# Patient Record
Sex: FEMALE | Race: WHITE | Employment: OTHER | URBAN - METROPOLITAN AREA
[De-identification: names, ages, dates, MRNs, and addresses within clinical notes are randomized per-mention and may not be internally consistent; named-entity substitution may affect disease eponyms.]

---

## 2017-01-12 ENCOUNTER — OFFICE VISIT (OUTPATIENT)
Dept: PAIN MANAGEMENT | Age: 67
End: 2017-01-12

## 2017-01-12 VITALS
BODY MASS INDEX: 19.15 KG/M2 | SYSTOLIC BLOOD PRESSURE: 163 MMHG | WEIGHT: 122 LBS | HEART RATE: 61 BPM | HEIGHT: 67 IN | DIASTOLIC BLOOD PRESSURE: 87 MMHG

## 2017-01-12 DIAGNOSIS — R07.81 RIB PAIN: ICD-10-CM

## 2017-01-12 DIAGNOSIS — G54.8 OTHER NERVE ROOT AND PLEXUS DISORDERS: ICD-10-CM

## 2017-01-12 DIAGNOSIS — Z79.899 ENCOUNTER FOR LONG-TERM (CURRENT) USE OF HIGH-RISK MEDICATION: ICD-10-CM

## 2017-01-12 DIAGNOSIS — G89.4 CHRONIC PAIN SYNDROME: Primary | ICD-10-CM

## 2017-01-12 RX ORDER — HYDROCODONE BITARTRATE AND ACETAMINOPHEN 10; 325 MG/1; MG/1
1 TABLET ORAL DAILY
Qty: 30 TAB | Refills: 0 | Status: SHIPPED | OUTPATIENT
Start: 2017-01-12 | End: 2017-04-06 | Stop reason: SDUPTHER

## 2017-01-12 RX ORDER — FENTANYL 50 UG/1
1 PATCH TRANSDERMAL
Qty: 15 PATCH | Refills: 0 | Status: SHIPPED | OUTPATIENT
Start: 2017-01-12 | End: 2017-04-06 | Stop reason: SDUPTHER

## 2017-01-12 RX ORDER — DIAZEPAM 5 MG/1
TABLET ORAL
Qty: 15 TAB | Refills: 2 | Status: SHIPPED | OUTPATIENT
Start: 2017-01-12 | End: 2017-07-19 | Stop reason: SDUPTHER

## 2017-01-12 RX ORDER — ZOLPIDEM TARTRATE 12.5 MG/1
12.5 TABLET, FILM COATED, EXTENDED RELEASE ORAL
Qty: 30 TAB | Refills: 5 | Status: SHIPPED | OUTPATIENT
Start: 2017-01-12 | End: 2017-07-19 | Stop reason: SDUPTHER

## 2017-01-12 NOTE — MR AVS SNAPSHOT
Visit Information Date & Time Provider Department Dept. Phone Encounter #  
 1/12/2017 11:20 AM Bari [place]osillo, [address] [facility] [phone] [id number] Upcoming Health Maintenance Date Due Hepatitis C Screening 1950 Pneumococcal 19-64 Medium Risk (1 of 1 - PPSV23) 8/9/1971 DTaP/Tdap/Td series (1 - Tdap) 8/9/1973 PAP AKA CERVICAL CYTOLOGY 8/9/1973 BREAST CANCER SCRN MAMMOGRAM 8/9/2002 FOBT Q 1 YEAR AGE 50-75 8/9/2002 ZOSTER VACCINE AGE 60> 8/9/2012 INFLUENZA AGE 9 TO ADULT 8/1/2016 Allergies as of 1/12/2017  Review Complete On: 1/12/2017 By: HAYDEE Roe Severity Noted Reaction Type Reactions Ceftin [Cefuroxime Axetil]    Itching Norvasc [Amlodipine]    Not Reported This Time  
 Pcn [Penicillins]    Hives Sulfa (Sulfonamide Antibiotics)    Other (comments) GI problems Current Immunizations  Never Reviewed No immunizations on file. Not reviewed this visit You Were Diagnosed With   
  
 Codes Comments Chronic pain syndrome    -  Primary ICD-10-CM: G89.4 ICD-9-CM: 338.4 Encounter for long-term (current) use of high-risk medication     ICD-10-CM: Z79.899 ICD-9-CM: V58.69 Rib pain     ICD-10-CM: R07.81 ICD-9-CM: 786.50 Other nerve root and plexus disorders     ICD-10-CM: G54.8 ICD-9-CM: 353.8 Vitals BP Pulse Height(growth percentile) Weight(growth percentile) BMI OB Status 163/87 61 5' 7\" (1.702 m) 122 lb (55.3 kg) 19.11 kg/m2 Postmenopausal  
 Smoking Status Current Every Day Smoker Vitals History BMI and BSA Data Body Mass Index Body Surface Area  
 19.11 kg/m 2 1.62 m 2 Preferred Pharmacy Pharmacy Name Phone [address] [facility] [phone] Your Updated Medication List  
  
   
 This list is accurate as of: 1/12/17 12:28 PM.  Always use your most recent med list.  
  
  
  
  
 * AMBIEN 10 mg tablet Generic drug:  zolpidem Take  by mouth nightly as needed. * zolpidem CR 12.5 mg tablet Commonly known as:  AMBIEN CR Take 1 Tab by mouth nightly as needed for Sleep for up to 30 days. Max Daily Amount: 12.5 mg. Indications: INSOMNIA  
  
 benzonatate 200 mg capsule Commonly known as:  TESSALON Take 200 mg by mouth three (3) times daily as needed. BONIVA PO Take  by mouth. COZAAR 25 mg tablet Generic drug:  losartan Take  by mouth daily. diazePAM 5 mg tablet Commonly known as:  VALIUM Take one tab po qd prn muscle spasms/sleep. Due to fill 01/28/17, 02/26/17, 03/27/17  Indications: MUSCLE SPASM * fentaNYL 50 mcg/hr PATCH Commonly known as:  DURAGESIC  
1 Patch by TransDERmal route every fourty-eight (48) hours for 30 days. For chronic pain. Apply 1 patch to upper body q 48 hours for chronic pain. Due to fill 01/27/17 [employer] brand only  Indications: CHRONIC PAIN WITH OPIOID TOLERANCE, SEVERE PAIN WITH OPIOID TOLERANCE  
  
 * fentaNYL 50 mcg/hr PATCH Commonly known as:  DURAGESIC  
1 Patch by TransDERmal route every fourty-eight (48) hours for 30 days. For chronic pain. Apply 1 patch to upper body q 48 hours for chronic pain. Due to fill 02/25/17 [employer] brand only  Indications: CHRONIC PAIN WITH OPIOID TOLERANCE, SEVERE PAIN WITH OPIOID TOLERANCE  
  
 * fentaNYL 50 mcg/hr PATCH Commonly known as:  DURAGESIC  
1 Patch by TransDERmal route every fourty-eight (48) hours for 30 days. For chronic pain. Apply 1 patch to upper body q 48 hours for chronic pain. Do Not Fill until 03/26/17 M[employer] brand only  Indications: CHRONIC PAIN WITH OPIOID TOLERANCE, SEVERE PAIN WITH OPIOID TOLERANCE  
  
 guaiFENesin 100 mg/5 mL liquid Commonly known as:  ROBITUSSIN Take 200 mg by mouth three (3) times daily as needed for Cough. hydroCHLOROthiazide 12.5 mg capsule Commonly known as:  Sierra Cole Take 12.5 mg by mouth daily. Only takes once or twice a week * HYDROcodone-acetaminophen  mg per tablet Commonly known as:  Hailee Pinta Take 1 Tab by mouth three (3) times daily. Prn activity pain (due to fill 3/2/12, 12, 12) * HYDROcodone-acetaminophen  mg tablet Commonly known as:  Lynnetta Boone Take 1 Tab by mouth daily for 30 days. For breakthrough pain. Due to fill 17  Indications: PAIN  
  
 * HYDROcodone-acetaminophen  mg tablet Commonly known as:  Lynnetta Boone Take 1 Tab by mouth daily for 30 days. For breakthrough pain. Due to fill 17  Indications: PAIN  
  
 * HYDROcodone-acetaminophen  mg tablet Commonly known as:  Lynnetta Boone Take 1 Tab by mouth daily for 30 days. For breakthrough pain. Due to fill 16  Indications: PAIN  
  
 metoprolol succinate 25 mg XL tablet Commonly known as:  TOPROL-XL Take  by mouth daily. MOTRIN  mg tablet Generic drug:  ibuprofen Take  by mouth. TYLENOL PO Take  by mouth. VENTOLIN HFA 90 mcg/actuation inhaler Generic drug:  albuterol Take 1 Puff by inhalation. VITAMIN D2 PO Take  by mouth. XYZAL 5 mg tablet Generic drug:  levocetirizine Take  by mouth daily. * Notice: This list has 9 medication(s) that are the same as other medications prescribed for you. Read the directions carefully, and ask your doctor or other care provider to review them with you. Prescriptions Printed Refills  
 diazePAM (VALIUM) 5 mg tablet 2 Sig: Take one tab po qd prn muscle spasms/sleep. Due to fill 17, 17, 17  Indications: MUSCLE SPASM Class: Print  
 fentaNYL (DURAGESIC) 50 mcg/hr PATCH 0 Si Patch by TransDERmal route every fourty-eight (48) hours for 30 days. For chronic pain. Apply 1 patch to upper body q 48 hours for chronic pain. Due to fill 17 M[employer] brand only  Indications: CHRONIC PAIN WITH OPIOID TOLERANCE, SEVERE PAIN WITH OPIOID TOLERANCE Class: Print Route: TransDERmal  
 HYDROcodone-acetaminophen (NORCO)  mg tablet 0 Sig: Take 1 Tab by mouth daily for 30 days. For breakthrough pain. Due to fill 17  Indications: PAIN Class: Print Route: Oral  
 fentaNYL (DURAGESIC) 50 mcg/hr PATCH 0 Si Patch by TransDERmal route every fourty-eight (48) hours for 30 days. For chronic pain. Apply 1 patch to upper body q 48 hours for chronic pain. Due to fill 17 M[employer] brand only  Indications: CHRONIC PAIN WITH OPIOID TOLERANCE, SEVERE PAIN WITH OPIOID TOLERANCE Class: Print Route: TransDERmal  
 HYDROcodone-acetaminophen (NORCO)  mg tablet 0 Sig: Take 1 Tab by mouth daily for 30 days. For breakthrough pain. Due to fill 17  Indications: PAIN Class: Print Route: Oral  
 fentaNYL (DURAGESIC) 50 mcg/hr PATCH 0 Si Patch by TransDERmal route every fourty-eight (48) hours for 30 days. For chronic pain. Apply 1 patch to upper body q 48 hours for chronic pain. Do Not Fill until 17 Mylan brand only  Indications: CHRONIC PAIN WITH OPIOID TOLERANCE, SEVERE PAIN WITH OPIOID TOLERANCE Class: Print Route: TransDERmal  
 HYDROcodone-acetaminophen (NORCO)  mg tablet 0 Sig: Take 1 Tab by mouth daily for 30 days. For breakthrough pain. Due to fill 16  Indications: PAIN Class: Print Route: Oral  
 zolpidem CR (AMBIEN CR) 12.5 mg tablet 5 Sig: Take 1 Tab by mouth nightly as needed for Sleep for up to 30 days. Max Daily Amount: 12.5 mg. Indications: INSOMNIA Class: Print Route: Oral  
  
Patient Instructions 1. Continue medications as prescribed. 2.  Follow up in three months Introducing [facility]! Yoel Dunham introduces [employer] patient portal. Now you can access parts of your medical record, email your doctor's office, and request medication refills online. 1. In your internet browser, go to [email or web address] 2. Click on the First Time User? Click Here link in the Sign In box. You will see the New Member Sign Up page. 3. Enter your fsboWOW Access Code exactly as it appears below. You will not need to use this code after youve completed the sign-up process. If you do not sign up before the expiration date, you must request a new code. · fsboWOW Access Code: [email or web address] Expires: 4/12/2017 12:28 PM 
 
4. Enter the last four digits of your Social Security Number (xxxx) and Date of Birth (mm/dd/yyyy) as indicated and click Submit. You will be taken to the next sign-up page. 5. Create a [employer]st ID. This will be your fsboWOW login ID and cannot be changed, so think of one that is secure and easy to remember. 6. Create a fsboWOW password. You can change your password at any time. 7. Enter your Password Reset Question and Answer. This can be used at a later time if you forget your password. 8. Enter your e-mail address. You will receive e-mail notification when new information is available in [address] 9. Click Sign Up. You can now view and download portions of your medical record. 10. Click the Download Summary menu link to download a portable copy of your medical information. If you have questions, please visit the Frequently Asked Questions section of the fsboWOW website. Remember, fsboWOW is NOT to be used for urgent needs. For medical emergencies, dial 911. Now available from your iPhone and Android! Please provide this summary of care documentation to your next provider. Your primary care clinician is listed as Jeane Méndez. If you have any questions after today's visit, please call [phone].

## 2017-01-12 NOTE — PROGRESS NOTES
Nursing Notes    Patient presents to the office today in follow-up. Patient rates her pain at 1/10 on the numerical pain scale. Reviewed medications with counts as follows:    Rx Date filled Qty Dispensed Pill Count Last Dose Short   Fentanyl 50 mcg/hr  12/29/16 15 9 Current patch on right breast no   norco 10/325 mg 12/29/16 30 18 today no   Valium 5 mg 12/30/16 15 12 01/07-01/08/17 no                POC UDS was not performed in office today    Any new labs or imaging since last appointment? NO    Have you been to an emergency room (ER) or urgent care clinic since your last visit? NO            Have you been hospitalized since your last visit? NO     If yes, where, when, and reason for visit? Have you seen or consulted any other health care providers outside of the [facility]  since your last visit? NO     If yes, where, when, and reason for visit? HM deferred to pcp.

## 2017-01-12 NOTE — PROGRESS NOTES
HISTORY OF PRESENT ILLNESS  Nicky Huerta Case is a 61 y.o. female. HPI  The patient presents today for follow up of chronic severe pain involving the bilaterally with underlying intercostal neuritis. The patient denies any significant changes since last f/u. She tolerates medications without side effects. Maxine Case reports no change in sleep or constipation. She takes [place] cr to help her sleep at night. No cpap. No constipation. The patient reports 80% relief with current medications. She states reaching, getting up and down and twisting aggravate her pain. Rest and medication seem to help alleviate her symptoms. She had injections in the past and they did not help the pain. Her pain is constant and achy. She has episodes of her ribs feeling like they move out of place but when she lays down for a little while this resolves. Advised to follow up with pcp regarding possible bone density. The patient reports functional improvement and QOL with pain medication. UDS 10/19/16 reviewed and consistent. Review of Systems   Constitutional: Positive for weight loss. Negative for chills, fever and malaise/fatigue. HENT: Negative for congestion, ear pain and sore throat (occasioinal). Eyes: Negative. Glasses   Respiratory: Positive for cough (chronic bronchitis) and wheezing (occasional). Negative for shortness of breath. Cardiovascular: Positive for chest pain (ribs). Gastrointestinal: Negative for abdominal pain, constipation, heartburn, nausea and vomiting. Genitourinary: Negative. Musculoskeletal: Positive for back pain (from ribs). Negative for falls, joint pain, myalgias and neck pain. Skin: Negative for itching and rash. Neurological: Positive for weakness and headaches (sinus Pierre Copping). Negative for dizziness, speech change and seizures. Endo/Heme/Allergies: Negative for environmental allergies. Psychiatric/Behavioral: Positive for depression (some). Negative for suicidal ideas. The patient is nervous/anxious ( valium helps manage) and has insomnia. Physical Exam   Constitutional: She is oriented to person, place, and time. She appears well-developed and well-nourished. No distress. thin   HENT:   Head: Normocephalic. Eyes: Conjunctivae and EOM are normal. Pupils are equal, round, and reactive to light.   glasses   Neck: Normal range of motion. Pulmonary/Chest: Effort normal. No respiratory distress. Musculoskeletal: She exhibits tenderness (left and right lower ribs/scarring). She exhibits no edema. Neurological: She is alert and oriented to person, place, and time. Psychiatric: She has a normal mood and affect. Her behavior is normal. Judgment and thought content normal.   Nursing note and vitals reviewed. ASSESSMENT and PLAN  Encounter Diagnoses   Name Primary?  Chronic pain syndrome Yes    Encounter for long-term (current) use of high-risk medication     Rib pain     Other nerve root and plexus disorders      Orders Placed This Encounter    diazePAM (VALIUM) 5 mg tablet    fentaNYL (DURAGESIC) 50 mcg/hr PATCH    HYDROcodone-acetaminophen (NORCO)  mg tablet    fentaNYL (DURAGESIC) 50 mcg/hr PATCH    HYDROcodone-acetaminophen (NORCO)  mg tablet    fentaNYL (DURAGESIC) 50 mcg/hr PATCH    HYDROcodone-acetaminophen (NORCO)  mg tablet    zolpidem CR (AMBIEN CR) 12.5 mg tablet     Patient Instructions   1. Continue medications as prescribed.   2.  Follow up in three months

## 2017-04-06 ENCOUNTER — OFFICE VISIT (OUTPATIENT)
Dept: PAIN MANAGEMENT | Age: 67
End: 2017-04-06

## 2017-04-06 VITALS
HEIGHT: 67 IN | DIASTOLIC BLOOD PRESSURE: 78 MMHG | WEIGHT: 122 LBS | SYSTOLIC BLOOD PRESSURE: 142 MMHG | HEART RATE: 56 BPM | BODY MASS INDEX: 19.15 KG/M2

## 2017-04-06 DIAGNOSIS — G54.8 OTHER NERVE ROOT AND PLEXUS DISORDERS: ICD-10-CM

## 2017-04-06 DIAGNOSIS — G47.01 INSOMNIA SECONDARY TO CHRONIC PAIN: ICD-10-CM

## 2017-04-06 DIAGNOSIS — Z79.899 ENCOUNTER FOR LONG-TERM (CURRENT) USE OF HIGH-RISK MEDICATION: ICD-10-CM

## 2017-04-06 DIAGNOSIS — Z79.899 ENCOUNTER FOR LONG TERM BENZODIAZEPINE THERAPY: ICD-10-CM

## 2017-04-06 DIAGNOSIS — G89.4 CHRONIC PAIN SYNDROME: Primary | ICD-10-CM

## 2017-04-06 DIAGNOSIS — R07.81 RIB PAIN: ICD-10-CM

## 2017-04-06 DIAGNOSIS — G89.29 INSOMNIA SECONDARY TO CHRONIC PAIN: ICD-10-CM

## 2017-04-06 LAB
ALCOHOL UR POC: NORMAL
AMPHETAMINES UR POC: NORMAL
BARBITURATES UR POC: NORMAL
BENZODIAZEPINES UR POC: NORMAL
BUPRENORPHINE UR POC: NORMAL
CANNABINOIDS UR POC: NORMAL
CARISOPRODOL UR POC: NORMAL
COCAINE UR POC: NORMAL
FENTANYL UR POC: NORMAL
MDMA/ECSTASY UR POC: NORMAL
METHADONE UR POC: NORMAL
METHAMPHETAMINE UR POC: NORMAL
METHYLPHENIDATE UR POC: NORMAL
OPIATES UR POC: NORMAL
OXYCODONE UR POC: NORMAL
PHENCYCLIDINE UR POC: NORMAL
PROPOXYPHENE UR POC: NORMAL
TRAMADOL UR POC: NORMAL
TRICYCLICS UR POC: NORMAL

## 2017-04-06 RX ORDER — FENTANYL 50 UG/1
1 PATCH TRANSDERMAL
Qty: 15 PATCH | Refills: 0 | Status: SHIPPED | OUTPATIENT
Start: 2017-04-06 | End: 2017-07-19 | Stop reason: SDUPTHER

## 2017-04-06 RX ORDER — DIAZEPAM 5 MG/1
TABLET ORAL
Qty: 15 TAB | Refills: 2 | Status: SHIPPED | OUTPATIENT
Start: 2017-04-06 | End: 2017-07-19 | Stop reason: SDUPTHER

## 2017-04-06 RX ORDER — HYDROCODONE BITARTRATE AND ACETAMINOPHEN 10; 325 MG/1; MG/1
1 TABLET ORAL DAILY
Qty: 30 TAB | Refills: 0 | Status: SHIPPED | OUTPATIENT
Start: 2017-04-06 | End: 2017-07-19 | Stop reason: SDUPTHER

## 2017-04-06 RX ORDER — NALOXONE HYDROCHLORIDE 4 MG/.1ML
4 SPRAY NASAL AS NEEDED
Qty: 1 BOX | Refills: 1 | Status: SHIPPED | OUTPATIENT
Start: 2017-04-06

## 2017-04-06 NOTE — PROGRESS NOTES
HISTORY OF PRESENT ILLNESS  Anyi Peoples Case is a 61 y.o. female. HPI  The patient presents today for follow up of chronic  severe pain involving the bilaterally with underlying intercostal neuritis. Because the patient's current regimen places him/her at increased risk for possible overdose, a prescription for naloxone nasal spray is being provided. The patient understands that this medication is only to be used in the setting of a possible overdose and that inadvertent use of this medication could precipitate overt withdrawal.    Measuring clinical outcomes of chronic pain patients: score 8; the lower the number the better the outcome. She is being followed for GI pain. Will see specialist this month. Epigastric pain. This has been going on for about 6-7 months. She brought us a copy of recent lab work. The patient denies any other significant changes since last f/u. She tolerates medications without side effects. Maxine Gutierrez reports no change in sleep or constipation. She takes [place] cr to help her sleep at night. No cpap. No constipation. She also takes valium on rare occasion for muscle spasms at night. The patient reports 80% relief with current medications. She states reaching, getting up and down and twisting aggravate her pain. Rest and medication seem to help alleviate her symptoms. She had injections in the past and they did not help the pain. Her pain is constant and achy. She has episodes of her ribs feeling like they move out of place but when she lays down for a little while this resolves. Advised to follow up with pcp regarding possible bone density. The patient reports functional improvement and QOL with pain medication. She does continue to use cheratussin cough medicine on occasion. rx from 11/2016. Advised not to take motrin. She rarely takes (only taken 2-3 in three month period).        UDS  10/19/16 reviewed and appears consistent   reviewed and appears consistent    Review of Systems   Constitutional: Positive for weight loss. Negative for chills, fever and malaise/fatigue. HENT: Negative for congestion, ear pain and sore throat (occasioinal). Eyes: Negative. Glasses   Respiratory: Positive for cough (chronic bronchitis) and wheezing (occasional). Negative for shortness of breath. Cardiovascular: Positive for chest pain (ribs). Gastrointestinal: Negative for abdominal pain, constipation, heartburn, nausea and vomiting. Genitourinary: Negative. Musculoskeletal: Positive for back pain (from ribs). Negative for falls, joint pain, myalgias and neck pain. Skin: Negative for itching and rash. Neurological: Positive for weakness and headaches (sinus Theta Plank). Negative for dizziness, speech change and seizures. Endo/Heme/Allergies: Negative for environmental allergies. Psychiatric/Behavioral: Positive for depression (some). Negative for suicidal ideas. The patient is nervous/anxious ( valium helps manage) and has insomnia. Physical Exam   Constitutional: She is oriented to person, place, and time. She appears well-developed and well-nourished. No distress. thin   HENT:   Head: Normocephalic. Eyes: Conjunctivae and EOM are normal. Pupils are equal, round, and reactive to light.   glasses   Neck: Normal range of motion. Pulmonary/Chest: Effort normal. No respiratory distress. Musculoskeletal: She exhibits tenderness (left and right lower ribs/scarring). She exhibits no edema. Neurological: She is alert and oriented to person, place, and time. Psychiatric: She has a normal mood and affect. Her behavior is normal. Judgment and thought content normal.   Nursing note and vitals reviewed. ASSESSMENT and PLAN  Encounter Diagnoses   Name Primary?     Chronic pain syndrome Yes    Other nerve root and plexus disorders     Myalgia and myositis     Encounter for long term benzodiazepine therapy     Rib pain  Insomnia secondary to chronic pain      Orders Placed This Encounter    fentaNYL (DURAGESIC) 50 mcg/hr PATCH    HYDROcodone-acetaminophen (NORCO)  mg tablet    fentaNYL (DURAGESIC) 50 mcg/hr PATCH    HYDROcodone-acetaminophen (NORCO)  mg tablet    fentaNYL (DURAGESIC) 50 mcg/hr PATCH    HYDROcodone-acetaminophen (NORCO)  mg tablet    diazePAM (VALIUM) 5 mg tablet    naloxone 4 mg/actuation spry        Patient Instructions   1. Continue medications as prescribed. 2.  Follow up in three months  3. Naloxone nasal spray issued.

## 2017-04-06 NOTE — PATIENT INSTRUCTIONS
1.  Continue medications as prescribed. 2.  Follow up in three months  3. Naloxone nasal spray issued.

## 2017-04-06 NOTE — MR AVS SNAPSHOT
Visit Information Date & Time Provider Department Dept. Phone Encounter #  
 4/6/2017 10:40 AM Susie Judd, [address] [place] [address] for Pain Management [phone] [id number] Follow-up Instructions Return in about 3 months (around 7/6/2017). Upcoming Health Maintenance Date Due Hepatitis C Screening 1950 Pneumococcal 19-64 Medium Risk (1 of 1 - PPSV23) 8/9/1971 DTaP/Tdap/Td series (1 - Tdap) 8/9/1973 PAP AKA CERVICAL CYTOLOGY 8/9/1973 BREAST CANCER SCRN MAMMOGRAM 8/9/2002 FOBT Q 1 YEAR AGE 50-75 8/9/2002 ZOSTER VACCINE AGE 60> 8/9/2012 INFLUENZA AGE 9 TO ADULT 8/1/2016 Allergies as of 4/6/2017  Review Complete On: 4/6/2017 By: HAYDEE Nunez Severity Noted Reaction Type Reactions Ceftin [Cefuroxime Axetil]    Itching Norvasc [Amlodipine]    Not Reported This Time  
 Pcn [Penicillins]    Hives Sulfa (Sulfonamide Antibiotics)    Other (comments) GI problems Current Immunizations  Never Reviewed No immunizations on file. Not reviewed this visit You Were Diagnosed With   
  
 Codes Comments Chronic pain syndrome    -  Primary ICD-10-CM: G89.4 ICD-9-CM: 338.4 Other nerve root and plexus disorders     ICD-10-CM: G54.8 ICD-9-CM: 353.8 Myalgia and myositis     ICD-10-CM: Esha Essence ICD-9-CM: 729.1 Encounter for long term benzodiazepine therapy     ICD-10-CM: Z79.899 ICD-9-CM: V58.69 Rib pain     ICD-10-CM: R07.81 ICD-9-CM: 786.50 Insomnia secondary to chronic pain     ICD-10-CM: G89.29, G47.01 
ICD-9-CM: 338.29, 327.01 Vitals BP Pulse Height(growth percentile) Weight(growth percentile) BMI OB Status 142/78 (!) 56 5' 7\" (1.702 m) 122 lb (55.3 kg) 19.11 kg/m2 Postmenopausal  
 Smoking Status Current Every Day Smoker Vitals History BMI and BSA Data Body Mass Index Body Surface Area  
 19.11 kg/m 2 1.62 m 2 Preferred Pharmacy Pharmacy Name Phone [facility], [address] [place] [address] [phone] Your Updated Medication List  
  
   
This list is accurate as of: 4/6/17 11:17 AM.  Always use your most recent med list.  
  
  
  
  
 AMBIEN 10 mg tablet Generic drug:  zolpidem Take  by mouth nightly as needed. benzonatate 200 mg capsule Commonly known as:  TESSALON Take 200 mg by mouth three (3) times daily as needed. BONIVA PO Take  by mouth. COZAAR 25 mg tablet Generic drug:  losartan Take  by mouth daily. * diazePAM 5 mg tablet Commonly known as:  VALIUM Take one tab po qd prn muscle spasms/sleep. Due to fill 01/28/17, 02/26/17, 03/27/17  Indications: MUSCLE SPASM * diazePAM 5 mg tablet Commonly known as:  VALIUM Take one tab po qd prn muscle spasms/sleep. Due to fill 04/25/17, 05/24/17, 06/22/17  Indications: MUSCLE SPASM * fentaNYL 50 mcg/hr PATCH Commonly known as:  DURAGESIC  
1 Patch by TransDERmal route every fourty-eight (48) hours for 30 days. For chronic pain. Apply 1 patch to upper body q 48 hours for chronic pain. Due to fill 04/25/17 M[employer] brand only  Indications: Chronic Pain with Opioid Tolerance, SEVERE PAIN WITH OPIOID TOLERANCE  
  
 * fentaNYL 50 mcg/hr PATCH Commonly known as:  DURAGESIC  
1 Patch by TransDERmal route every fourty-eight (48) hours for 30 days. For chronic pain. Apply 1 patch to upper body q 48 hours for chronic pain. Due to fill 05/24/17 M[employer] brand only  Indications: Chronic Pain with Opioid Tolerance, SEVERE PAIN WITH OPIOID TOLERANCE  
  
 * fentaNYL 50 mcg/hr PATCH Commonly known as:  DURAGESIC  
1 Patch by TransDERmal route every fourty-eight (48) hours for 30 days. For chronic pain. Apply 1 patch to upper body q 48 hours for chronic pain.  Do Not Fill until 06/22/17 Mylan brand only  Indications: Chronic Pain with Opioid Tolerance, SEVERE PAIN WITH OPIOID TOLERANCE  
  
 guaiFENesin 100 mg/5 mL liquid Commonly known as:  ROBITUSSIN Take 200 mg by mouth three (3) times daily as needed for Cough. hydroCHLOROthiazide 12.5 mg capsule Commonly known as:  Basil Loth Take 12.5 mg by mouth daily. Only takes once or twice a week * HYDROcodone-acetaminophen  mg per tablet Commonly known as:  Brandon Daoock Take 1 Tab by mouth three (3) times daily. Prn activity pain (due to fill 3/2/12, 12, 12) * HYDROcodone-acetaminophen  mg tablet Commonly known as:  Irene Yao Take 1 Tab by mouth daily for 30 days. For breakthrough pain. Due to fill 17  Indications: Pain  
  
 * HYDROcodone-acetaminophen  mg tablet Commonly known as:  Irene Yao Take 1 Tab by mouth daily for 30 days. For breakthrough pain. Due to fill 17  Indications: Pain  
  
 * HYDROcodone-acetaminophen  mg tablet Commonly known as:  Irene Yao Take 1 Tab by mouth daily for 30 days. For breakthrough pain. Due to fill 17  Indications: Pain  
  
 metoprolol succinate 25 mg XL tablet Commonly known as:  TOPROL-XL Take  by mouth daily. MOTRIN  mg tablet Generic drug:  ibuprofen Take  by mouth.  
  
 naloxone 4 mg/actuation Spry 4 mg by Nasal route as needed for up to 2 doses. Indications: OPIOID TOXICITY TYLENOL PO Take  by mouth. VENTOLIN HFA 90 mcg/actuation inhaler Generic drug:  albuterol Take 1 Puff by inhalation. VITAMIN D2 PO Take  by mouth. XYZAL 5 mg tablet Generic drug:  levocetirizine Take  by mouth daily. * Notice: This list has 9 medication(s) that are the same as other medications prescribed for you. Read the directions carefully, and ask your doctor or other care provider to review them with you. Prescriptions Printed Refills  
 fentaNYL (DURAGESIC) 50 mcg/hr PATCH 0  Si Patch by TransDERmal route every fourty-eight (48) hours for 30 days. For chronic pain. Apply 1 patch to upper body q 48 hours for chronic pain. Due to fill 17 Mylan brand only  Indications: Chronic Pain with Opioid Tolerance, SEVERE PAIN WITH OPIOID TOLERANCE Class: Print Route: TransDERmal  
 HYDROcodone-acetaminophen (NORCO)  mg tablet 0 Sig: Take 1 Tab by mouth daily for 30 days. For breakthrough pain. Due to fill 17  Indications: Pain Class: Print Route: Oral  
 fentaNYL (DURAGESIC) 50 mcg/hr PATCH 0 Si Patch by TransDERmal route every fourty-eight (48) hours for 30 days. For chronic pain. Apply 1 patch to upper body q 48 hours for chronic pain. Due to fill 17 Mylan brand only  Indications: Chronic Pain with Opioid Tolerance, SEVERE PAIN WITH OPIOID TOLERANCE Class: Print Route: TransDERmal  
 HYDROcodone-acetaminophen (NORCO)  mg tablet 0 Sig: Take 1 Tab by mouth daily for 30 days. For breakthrough pain. Due to fill 17  Indications: Pain Class: Print Route: Oral  
 fentaNYL (DURAGESIC) 50 mcg/hr PATCH 0 Si Patch by TransDERmal route every fourty-eight (48) hours for 30 days. For chronic pain. Apply 1 patch to upper body q 48 hours for chronic pain. Do Not Fill until 17 Mylan brand only  Indications: Chronic Pain with Opioid Tolerance, SEVERE PAIN WITH OPIOID TOLERANCE Class: Print Route: TransDERmal  
 HYDROcodone-acetaminophen (NORCO)  mg tablet 0 Sig: Take 1 Tab by mouth daily for 30 days. For breakthrough pain. Due to fill 17  Indications: Pain Class: Print Route: Oral  
 diazePAM (VALIUM) 5 mg tablet 2 Sig: Take one tab po qd prn muscle spasms/sleep. Due to fill 17, 17, 17  Indications: MUSCLE SPASM Class: Print Prescriptions Sent to Pharmacy Refills  
 naloxone 4 mg/actuation spry 1 Si mg by Nasal route as needed for up to 2 doses. Indications: OPIOID TOXICITY  Class: Normal  
 [facility]: [facility] [address] [place] [address] [facility] #: [phone] Route: Nasal  
  
Follow-up Instructions Return in about 3 months (around 7/6/2017). Patient Instructions 1. Continue medications as prescribed. 2.  Follow up in three months 3. Naloxone nasal spray issued. Introducing [facility]! [facility] introduces [employer]T patient portal. Now you can access parts of your medical record, email your doctor's office, and request medication refills online. 1. In your internet browser, go to [email or web address] 2. Click on the First Time User? Click Here link in the Sign In box. You will see the New Member Sign Up page. 3. Enter your SETiT Access Code exactly as it appears below. You will not need to use this code after youve completed the sign-up process. If you do not sign up before the expiration date, you must request a new code. · SETiT Access Code: [email or web address] Expires: 4/12/2017  1:28 PM 
 
4. Enter the last four digits of your Social Security Number (xxxx) and Date of Birth (mm/dd/yyyy) as indicated and click Submit. You will be taken to the next sign-up page. 5. Create a SETiT ID. This will be your SETiT login ID and cannot be changed, so think of one that is secure and easy to remember. 6. Create a SETiT password. You can change your password at any time. 7. Enter your Password Reset Question and Answer. This can be used at a later time if you forget your password. 8. Enter your e-mail address. You will receive e-mail notification when new information is available in [address] 9. Click Sign Up. You can now view and download portions of your medical record. 10. Click the Download Summary menu link to download a portable copy of your medical information.  
 
If you have questions, please visit the Frequently Asked Questions section of the [employer]. Remember, [employer] is NOT to be used for urgent needs. For medical emergencies, dial 911. Now available from your iPhone and Android! Please provide this summary of care documentation to your next provider. Your primary care clinician is listed as NONE. If you have any questions after today's visit, please call [phone].

## 2017-04-06 NOTE — PROGRESS NOTES
Nursing Notes    Patient presents to the office today in follow-up. Patient rates her pain at 2/10 on the numerical pain scale. Reviewed medications with counts as follows:    Rx Date filled Qty Dispensed Pill Count Last Dose Short   Fentanyl 50 mcg/hr  03/27/17 15 10, +1 on Current patch on right breast no   norco 10/325 mg 03/27/17 30 21 1/2 today no   Valium 5 mg 03/27/17 15 12 Within the last week no                    POC UDS was performed in office today    Any new labs or imaging since last appointment? NO    Have you been to an emergency room (ER) or urgent care clinic since your last visit? NO            Have you been hospitalized since your last visit? NO     If yes, where, when, and reason for visit? Have you seen or consulted any other health care providers outside of the [facility]  since your last visit? YES     If yes, where, when, and reason for visit? pcp at urgent care     deferred to pcp.

## 2017-07-19 ENCOUNTER — OFFICE VISIT (OUTPATIENT)
Dept: PAIN MANAGEMENT | Age: 67
End: 2017-07-19

## 2017-07-19 VITALS
DIASTOLIC BLOOD PRESSURE: 84 MMHG | HEART RATE: 65 BPM | TEMPERATURE: 97.8 F | RESPIRATION RATE: 16 BRPM | WEIGHT: 132 LBS | BODY MASS INDEX: 20.67 KG/M2 | SYSTOLIC BLOOD PRESSURE: 163 MMHG

## 2017-07-19 DIAGNOSIS — G89.29 CHRONIC BILATERAL LOW BACK PAIN WITHOUT SCIATICA: Primary | ICD-10-CM

## 2017-07-19 DIAGNOSIS — G89.4 CHRONIC PAIN SYNDROME: ICD-10-CM

## 2017-07-19 DIAGNOSIS — M54.50 CHRONIC BILATERAL LOW BACK PAIN WITHOUT SCIATICA: Primary | ICD-10-CM

## 2017-07-19 DIAGNOSIS — R07.81 RIB PAIN: ICD-10-CM

## 2017-07-19 RX ORDER — HYDROCODONE BITARTRATE AND ACETAMINOPHEN 10; 325 MG/1; MG/1
1 TABLET ORAL DAILY
Qty: 30 TAB | Refills: 0 | Status: SHIPPED | OUTPATIENT
Start: 2017-09-22 | End: 2017-10-23 | Stop reason: SDUPTHER

## 2017-07-19 RX ORDER — FENTANYL 50 UG/1
1 PATCH TRANSDERMAL
Qty: 15 PATCH | Refills: 0 | Status: SHIPPED | OUTPATIENT
Start: 2017-07-24 | End: 2017-10-23 | Stop reason: SDUPTHER

## 2017-07-19 RX ORDER — HYDROCODONE BITARTRATE AND ACETAMINOPHEN 10; 325 MG/1; MG/1
1 TABLET ORAL DAILY
Qty: 30 TAB | Refills: 0 | Status: SHIPPED | OUTPATIENT
Start: 2017-08-23 | End: 2017-10-23 | Stop reason: SDUPTHER

## 2017-07-19 RX ORDER — ZOLPIDEM TARTRATE 12.5 MG/1
12.5 TABLET, FILM COATED, EXTENDED RELEASE ORAL
Qty: 30 TAB | Refills: 0 | Status: SHIPPED | OUTPATIENT
Start: 2017-07-24 | End: 2017-10-23 | Stop reason: SDUPTHER

## 2017-07-19 RX ORDER — DIAZEPAM 5 MG/1
TABLET ORAL
Qty: 15 TAB | Refills: 0 | Status: SHIPPED | OUTPATIENT
Start: 2017-08-23 | End: 2018-01-18 | Stop reason: SDUPTHER

## 2017-07-19 RX ORDER — HYDROCODONE BITARTRATE AND ACETAMINOPHEN 10; 325 MG/1; MG/1
1 TABLET ORAL DAILY
Qty: 30 TAB | Refills: 0 | Status: SHIPPED | OUTPATIENT
Start: 2017-07-24 | End: 2017-10-23 | Stop reason: SDUPTHER

## 2017-07-19 RX ORDER — ZOLPIDEM TARTRATE 12.5 MG/1
12.5 TABLET, FILM COATED, EXTENDED RELEASE ORAL
Qty: 30 TAB | Refills: 0 | Status: SHIPPED | OUTPATIENT
Start: 2017-08-23 | End: 2017-09-22

## 2017-07-19 RX ORDER — DIAZEPAM 5 MG/1
TABLET ORAL
Qty: 15 TAB | Refills: 0 | Status: SHIPPED | OUTPATIENT
Start: 2017-09-22 | End: 2018-01-18 | Stop reason: SDUPTHER

## 2017-07-19 RX ORDER — FENTANYL 50 UG/1
1 PATCH TRANSDERMAL
COMMUNITY
End: 2018-08-07

## 2017-07-19 RX ORDER — DIAZEPAM 5 MG/1
TABLET ORAL
Qty: 15 TAB | Refills: 0 | Status: SHIPPED | OUTPATIENT
Start: 2017-07-24

## 2017-07-19 RX ORDER — HYDROCHLOROTHIAZIDE 12.5 MG/1
12.5 TABLET ORAL DAILY
COMMUNITY
End: 2018-10-03

## 2017-07-19 RX ORDER — FENTANYL 50 UG/1
1 PATCH TRANSDERMAL
Qty: 15 PATCH | Refills: 0 | Status: SHIPPED | OUTPATIENT
Start: 2017-09-22 | End: 2017-10-23 | Stop reason: SDUPTHER

## 2017-07-19 RX ORDER — ZOLPIDEM TARTRATE 12.5 MG/1
12.5 TABLET, FILM COATED, EXTENDED RELEASE ORAL
Qty: 30 TAB | Refills: 1 | Status: SHIPPED | OUTPATIENT
Start: 2017-09-22 | End: 2017-10-22

## 2017-07-19 RX ORDER — FENTANYL 50 UG/1
1 PATCH TRANSDERMAL
Qty: 15 PATCH | Refills: 0 | Status: SHIPPED | OUTPATIENT
Start: 2017-08-23 | End: 2017-10-23 | Stop reason: SDUPTHER

## 2017-07-19 NOTE — PROGRESS NOTES
HISTORY OF PRESENT ILLNESS  Marina Gutierrez is a 61 y.o. female. HPI Ms. Gutierrez returns today for f/u of chronic bilateral rib pain caused by a fall several years ago with fractures to 2 of her ribs. No h/o of surgery. PT previously with no improvement. She continues unchanged since last visit. She has been doing very well with her current treatment plan which has been offering significant pain control. She is planning to go out of town to [place] soon and would like her Valium and Ambien prescriptions written separately. She is otherwise doing well with no new complaints today. We will continue with no changes. I will have her follow-up in 3 months for reassessment. She is currently using Fentanyl 75 mcg Q 48hrs,  Norco 10/325mg every day PRN, Ibuprofen 800mg TID, Valium 5 mg every day, and Ambien 12.5 Qhs.  Medications are helping with pain control and quality of life. Her pain is 1/10 with medication and 7-8/10 without. Pt describes pain as aching and stabbing. Aggravating factors are not identified. Relieved with rest, medication,  and avoiding painful activities. Current treatment is helping to improve general activity, mood, walking, sleep, enjoyment of life     Pt does report constipation but is well controlled   She is otherwise doing well with no other complaints today. She denies any adverse events including nausea, vomiting, dizziness,  hallucinations, or seizures.           Allergies   Allergen Reactions    Ceftin [Cefuroxime Axetil] Itching    Norvasc [Amlodipine] Not Reported This Time    Pcn [Penicillins] Hives    Sulfa (Sulfonamide Antibiotics) Other (comments)     GI problems       Past Surgical History:   Procedure Laterality Date    HX APPENDECTOMY      HX CHOLECYSTECTOMY  2017    HX GYN      Ovarian cystectomy    HX GYN      Stillbirths, miscarriage,     HX OOPHORECTOMY      HX THORACOTOMY      HX TONSILLECTOMY      HX TUBAL LIGATION             Review of Systems Constitutional: Negative for chills. HENT: Negative for congestion, ear pain and sore throat (occasioinal). Eyes: Negative. Respiratory: Positive for wheezing (occasional). Gastrointestinal: Negative for constipation and heartburn. Musculoskeletal: Negative for falls, joint pain, myalgias and neck pain. Skin: Negative for itching and rash. Neurological: Negative for speech change and seizures. Endo/Heme/Allergies: Negative for environmental allergies. Psychiatric/Behavioral: Positive for depression. Negative for suicidal ideas. The patient is nervous/anxious. The patient does not have insomnia. Physical Exam   Constitutional: She is oriented to person, place, and time. She appears well-developed and well-nourished. HENT:   Head: Normocephalic. Eyes: EOM are normal.   Neck: Normal range of motion. Pulmonary/Chest: Effort normal. No respiratory distress. Musculoskeletal: She exhibits tenderness. She exhibits no edema. Neurological: She is alert and oriented to person, place, and time. Psychiatric: She has a normal mood and affect. Her behavior is normal. Judgment and thought content normal.   Nursing note and vitals reviewed. ASSESSMENT:    1. Chronic bilateral low back pain without sciatica    2. Rib pain    3. Chronic pain syndrome         [place] Prescription Monitoring Program was reviewed which does not demonstrate aberrancies and/or inconsistencies with regard to the historical prescribing of controlled medications to this patient by other providers. PLAN / Pt Instructions:  1. Continue current plan with no evidence of addiction or diversion. Stable on current medication without adverse events. 2. Refill  fentanyl  50 mcg patch every 48 hours. 3. Refill hydrocodone 10/325 mg once daily as needed for pain. 4. Refill Valium 5 mg once daily as needed with one refill  5. Refill Ambien 12.5 ER once nightly. With one refill  6.  Add Naloxone 4 mg nasal spray for opioid induced respiratory depression emergency only. 7. Discussed risks of addiction, dependency, and opioid induced hyperalgesia. 8. Return to clinic in 3 months     Medications Ordered Today   Medications    fentaNYL (DURAGESIC) 50 mcg/hr PATCH     Si Patch by TransDERmal route every fourty-eight (48) hours for 30 days. For chronic pain. Apply 1 patch to upper body q 48 hours for chronic pain. Mylan brand only  Indications: Chronic Pain with Opioid Tolerance, SEVERE PAIN WITH OPIOID TOLERANCE     Dispense:  15 Patch     Refill:  0    fentaNYL (DURAGESIC) 50 mcg/hr PATCH     Si Patch by TransDERmal route every fourty-eight (48) hours for 30 days. For chronic pain. Apply 1 patch to upper body q 48 hours for chronic pain. Mylan brand only  Indications: Chronic Pain with Opioid Tolerance, SEVERE PAIN WITH OPIOID TOLERANCE     Dispense:  15 Patch     Refill:  0    fentaNYL (DURAGESIC) 50 mcg/hr PATCH     Si Patch by TransDERmal route every fourty-eight (48) hours for 30 days. For chronic pain. Apply 1 patch to upper body q 48 hours for chronic pain. Mylan brand only  Indications: Chronic Pain with Opioid Tolerance, SEVERE PAIN WITH OPIOID TOLERANCE     Dispense:  15 Patch     Refill:  0    HYDROcodone-acetaminophen (NORCO)  mg tablet     Sig: Take 1 Tab by mouth daily for 30 days. For breakthrough pain. Indications: Pain     Dispense:  30 Tab     Refill:  0    HYDROcodone-acetaminophen (NORCO)  mg tablet     Sig: Take 1 Tab by mouth daily for 30 days. For breakthrough pain. Indications: Pain     Dispense:  30 Tab     Refill:  0    HYDROcodone-acetaminophen (NORCO)  mg tablet     Sig: Take 1 Tab by mouth daily for 30 days. For breakthrough pain. Indications: Pain     Dispense:  30 Tab     Refill:  0    zolpidem CR (AMBIEN CR) 12.5 mg tablet     Sig: Take 1 Tab by mouth nightly as needed for Sleep for up to 30 days. Max Daily Amount: 12.5 mg.  Indications: INSOMNIA Dispense:  30 Tab     Refill:  0    zolpidem CR (AMBIEN CR) 12.5 mg tablet     Sig: Take 1 Tab by mouth nightly as needed for Sleep for up to 30 days. Max Daily Amount: 12.5 mg. Indications: INSOMNIA     Dispense:  30 Tab     Refill:  0    zolpidem CR (AMBIEN CR) 12.5 mg tablet     Sig: Take 1 Tab by mouth nightly as needed for Sleep for up to 30 days. Max Daily Amount: 12.5 mg.     Dispense:  30 Tab     Refill:  1    diazePAM (VALIUM) 5 mg tablet     Sig: Take one tab po qd prn muscle spasms/sleep. Indications: MUSCLE SPASM     Dispense:  15 Tab     Refill:  0    diazePAM (VALIUM) 5 mg tablet     Sig: Take one tab po qd prn muscle spasms/sleep. Indications: MUSCLE SPASM     Dispense:  15 Tab     Refill:  0    diazePAM (VALIUM) 5 mg tablet     Sig: Take one tab po qd prn muscle spasms/sleep. Indications: MUSCLE SPASM     Dispense:  15 Tab     Refill:  0       Pain medications prescribed with the objective of pain relief and improved physical and psychosocial function in this patient. Spent 25 minutes with patient today reviewing the treatment plan, goals of treatment plan, and limitations of the treatment plan, to include the potential for side effects from medications and procedures. Cheli Carcamo, [id number] 7/19/2017 11:19 AM    Note: Please excuse any typographical errors. Voice recognition software was used for this note and may cause mistakes.

## 2017-07-19 NOTE — PROGRESS NOTES
Nursing Notes    Patient presents to the office today in follow-up. Patient rates her pain at 1/10 on the numerical pain scale. Reviewed medications with counts as follows:    Rx Date filled Qty Dispensed Pill Count Last Dose Short   Fentanyl 50 6/25/17 15 4+1 on Placed 7/17/17 no   norco 10 6/25/17 30 10 7/19/17 no   ambien  6/25/17 30 8 7/18/17 no   Valium 5 6/25/17 15 12 7/18/17 no                    Comments:     POC UDS was not performed in office today    Any new labs or imaging since last appointment? YES, pre op labs and imaging, CT, MRI and US of abdomen as well. Have you been to an emergency room (ER) or urgent care clinic since your last visit? YES , sinus infection    Have you been hospitalized since your last visit? no  If yes, where, when, and reason for visit? Have you seen or consulted any other health care providers outside of the [address] [place] [address]  since your last visit? Tayla Quiles for surgery (cholestectomy) and saw PCP for HTN and abd pain. GI consult and follow up with surgeon. If yes, where, when, and reason for visit? Ms. Gutierrez has a reminder for a \"due or due soon\" health maintenance. I have asked that she contact her primary care provider for follow-up on this health maintenance.

## 2017-07-19 NOTE — MR AVS SNAPSHOT
Visit Information Date & Time Provider Department Dept. Phone Encounter #  
 7/19/2017 10:40 AM Jessie Lopez, [address] [place] [address] for Pain Management [phone] [id number] Follow-up Instructions Return in about 3 months (around 10/19/2017). Upcoming Health Maintenance Date Due Hepatitis C Screening 1950 Pneumococcal 19-64 Medium Risk (1 of 1 - PPSV23) 8/9/1971 DTaP/Tdap/Td series (1 - Tdap) 8/9/1973 PAP AKA CERVICAL CYTOLOGY 8/9/1973 BREAST CANCER SCRN MAMMOGRAM 8/9/2002 FOBT Q 1 YEAR AGE 50-75 8/9/2002 ZOSTER VACCINE AGE 60> 8/9/2012 INFLUENZA AGE 9 TO ADULT 8/1/2017 Allergies as of 7/19/2017  Review Complete On: 7/19/2017 By: HAYDEE Youssef Severity Noted Reaction Type Reactions Ceftin [Cefuroxime Axetil]    Itching Norvasc [Amlodipine]    Not Reported This Time  
 Pcn [Penicillins]    Hives Sulfa (Sulfonamide Antibiotics)    Other (comments) GI problems Current Immunizations  Never Reviewed No immunizations on file. Not reviewed this visit You Were Diagnosed With   
  
 Codes Comments Chronic bilateral low back pain without sciatica    -  Primary ICD-10-CM: M54.5, G89.29 ICD-9-CM: 724.2, 338.29 Rib pain     ICD-10-CM: R07.81 ICD-9-CM: 786.50 Chronic pain syndrome     ICD-10-CM: G89.4 ICD-9-CM: 338. 4 Vitals BP Pulse Temp Resp Weight(growth percentile) BMI  
 163/84 65 97.8 °F (36.6 °C) 16 132 lb (59.9 kg) 20.67 kg/m2 OB Status Smoking Status Postmenopausal Current Every Day Smoker Vitals History BMI and BSA Data Body Mass Index Body Surface Area  
 20.67 kg/m 2 1.68 m 2 Preferred Pharmacy Pharmacy Name Phone [facility], [address] [place] [address] [phone] Your Updated Medication List  
  
   
This list is accurate as of: 7/19/17 11:06 AM.  Always use your most recent med list.  
 * AMBIEN 10 mg tablet Generic drug:  zolpidem Take  by mouth nightly as needed. * zolpidem CR 12.5 mg tablet Commonly known as:  AMBIEN CR Take 1 Tab by mouth nightly as needed for Sleep for up to 30 days. Max Daily Amount: 12.5 mg. Indications: INSOMNIA Start taking on:  7/24/2017  
  
 * zolpidem CR 12.5 mg tablet Commonly known as:  AMBIEN CR Take 1 Tab by mouth nightly as needed for Sleep for up to 30 days. Max Daily Amount: 12.5 mg. Indications: INSOMNIA Start taking on:  8/23/2017  
  
 * zolpidem CR 12.5 mg tablet Commonly known as:  AMBIEN CR Take 1 Tab by mouth nightly as needed for Sleep for up to 30 days. Max Daily Amount: 12.5 mg.  
Start taking on:  9/22/2017  
  
 benzonatate 200 mg capsule Commonly known as:  TESSALON Take 200 mg by mouth three (3) times daily as needed. BONIVA PO Take  by mouth. COZAAR 25 mg tablet Generic drug:  losartan Take  by mouth daily. * diazePAM 5 mg tablet Commonly known as:  VALIUM Take one tab po qd prn muscle spasms/sleep. Indications: MUSCLE SPASM Start taking on:  7/24/2017 * diazePAM 5 mg tablet Commonly known as:  VALIUM Take one tab po qd prn muscle spasms/sleep. Indications: MUSCLE SPASM Start taking on:  8/23/2017 * diazePAM 5 mg tablet Commonly known as:  VALIUM Take one tab po qd prn muscle spasms/sleep. Indications: MUSCLE SPASM Start taking on:  9/22/2017 * fentaNYL 50 mcg/hr PATCH Commonly known as:  DURAGESIC  
1 Patch by TransDERmal route every fourty-eight (48) hours. * fentaNYL 50 mcg/hr PATCH Commonly known as:  DURAGESIC  
1 Patch by TransDERmal route every fourty-eight (48) hours for 30 days. For chronic pain. Apply 1 patch to upper body q 48 hours for chronic pain. Mylan brand only  Indications: Chronic Pain with Opioid Tolerance, SEVERE PAIN WITH OPIOID TOLERANCE Start taking on:  7/24/2017  * fentaNYL 50 mcg/hr PATCH  
 Commonly known as:  DURAGESIC  
1 Patch by TransDERmal route every fourty-eight (48) hours for 30 days. For chronic pain. Apply 1 patch to upper body q 48 hours for chronic pain. Mylan brand only  Indications: Chronic Pain with Opioid Tolerance, SEVERE PAIN WITH OPIOID TOLERANCE Start taking on:  8/23/2017 * fentaNYL 50 mcg/hr PATCH Commonly known as:  DURAGESIC  
1 Patch by TransDERmal route every fourty-eight (48) hours for 30 days. For chronic pain. Apply 1 patch to upper body q 48 hours for chronic pain. Mylan brand only  Indications: Chronic Pain with Opioid Tolerance, SEVERE PAIN WITH OPIOID TOLERANCE Start taking on:  9/22/2017  
  
 guaiFENesin 100 mg/5 mL liquid Commonly known as:  ROBITUSSIN Take 200 mg by mouth three (3) times daily as needed for Cough. hydroCHLOROthiazide 12.5 mg tablet Commonly known as:  HYDRODIURIL Take 12.5 mg by mouth daily. * HYDROcodone-acetaminophen  mg per tablet Commonly known as:  Bhavya Low Take 1 Tab by mouth three (3) times daily. Prn activity pain (due to fill 3/2/12, 4/1/12, 4/30/12) * HYDROcodone-acetaminophen  mg tablet Commonly known as:  Angeli Dieter Take 1 Tab by mouth daily for 30 days. For breakthrough pain. Indications: Pain Start taking on:  7/24/2017  
  
 * HYDROcodone-acetaminophen  mg tablet Commonly known as:  Angeli Dieter Take 1 Tab by mouth daily for 30 days. For breakthrough pain. Indications: Pain Start taking on:  8/23/2017  
  
 * HYDROcodone-acetaminophen  mg tablet Commonly known as:  Angeli Dieter Take 1 Tab by mouth daily for 30 days. For breakthrough pain. Indications: Pain Start taking on:  9/22/2017  
  
 metoprolol succinate 25 mg XL tablet Commonly known as:  TOPROL-XL Take  by mouth daily. MOTRIN  mg tablet Generic drug:  ibuprofen Take  by mouth.  
  
 naloxone 4 mg/actuation Spry 4 mg by Nasal route as needed for up to 2 doses. Indications: OPIOID TOXICITY TYLENOL PO Take  by mouth. VENTOLIN HFA 90 mcg/actuation inhaler Generic drug:  albuterol Take 1 Puff by inhalation. VITAMIN D2 PO Take  by mouth. XYZAL 5 mg tablet Generic drug:  levocetirizine Take  by mouth daily. * Notice: This list has 15 medication(s) that are the same as other medications prescribed for you. Read the directions carefully, and ask your doctor or other care provider to review them with you. Prescriptions Printed Refills  
 fentaNYL (DURAGESIC) 50 mcg/hr PATCH 0 Starting on: 2017 Si Patch by TransDERmal route every fourty-eight (48) hours for 30 days. For chronic pain. Apply 1 patch to upper body q 48 hours for chronic pain. Mylan brand only  Indications: Chronic Pain with Opioid Tolerance, SEVERE PAIN WITH OPIOID TOLERANCE Class: Print Route: TransDERmal  
 HYDROcodone-acetaminophen (NORCO)  mg tablet 0 Starting on: 2017 Sig: Take 1 Tab by mouth daily for 30 days. For breakthrough pain. Indications: Pain Class: Print Route: Oral  
 HYDROcodone-acetaminophen (NORCO)  mg tablet 0 Starting on: 2017 Sig: Take 1 Tab by mouth daily for 30 days. For breakthrough pain. Indications: Pain Class: Print Route: Oral  
 HYDROcodone-acetaminophen (NORCO)  mg tablet 0 Starting on: 9/22/2017 Sig: Take 1 Tab by mouth daily for 30 days. For breakthrough pain. Indications: Pain Class: Print Route: Oral  
 zolpidem CR (AMBIEN CR) 12.5 mg tablet 0 Starting on: 7/24/2017 Sig: Take 1 Tab by mouth nightly as needed for Sleep for up to 30 days. Max Daily Amount: 12.5 mg. Indications: INSOMNIA Class: Print Route: Oral  
 zolpidem CR (AMBIEN CR) 12.5 mg tablet 0 Starting on: 8/23/2017 Sig: Take 1 Tab by mouth nightly as needed for Sleep for up to 30 days. Max Daily Amount: 12.5 mg. Indications: INSOMNIA Class: Print Route: Oral  
 zolpidem CR (AMBIEN CR) 12.5 mg tablet 1 Starting on: 9/22/2017 Sig: Take 1 Tab by mouth nightly as needed for Sleep for up to 30 days. Max Daily Amount: 12.5 mg.  
 Class: Print Route: Oral  
 diazePAM (VALIUM) 5 mg tablet 0 Starting on: 7/24/2017 Sig: Take one tab po qd prn muscle spasms/sleep. Indications: MUSCLE SPASM Class: Print  
 diazePAM (VALIUM) 5 mg tablet 0 Starting on: 8/23/2017 Sig: Take one tab po qd prn muscle spasms/sleep. Indications: MUSCLE SPASM Class: Print  
 diazePAM (VALIUM) 5 mg tablet 0 Starting on: 9/22/2017 Sig: Take one tab po qd prn muscle spasms/sleep. Indications: MUSCLE SPASM Class: Print Follow-up Instructions Return in about 3 months (around 10/19/2017). Patient Instructions 1. Continue current plan with no evidence of addiction or diversion. Stable on current medication without adverse events. 2. Refill  fentanyl  50 mcg patch every 48 hours. 3. Refill hydrocodone 10/325 mg once daily as needed for pain. 4. Refill Valium 5 mg once daily as needed with one refill 5. Refill Ambien 12.5 ER once nightly. With one refill 6. Add Naloxone 4 mg nasal spray for opioid induced respiratory depression emergency only. 7. Discussed risks of addiction, dependency, and opioid induced hyperalgesia. 8. Return to clinic in 3 months Introducing [facility]! [employer] introduces [employer] patient portal. Now you can access parts of your medical record, email your doctor's office, and request medication refills online. 1. In your internet browser, go to [email or web address] 2. Click on the First Time User? Click Here link in the Sign In box. You will see the New Member Sign Up page. 3. Enter your [employer] Access Code exactly as it appears below. You will not need to use this code after youve completed the sign-up process. If you do not sign up before the expiration date, you must request a new code. · [employer] Access Code: [email or web address] Expires: 10/17/2017 11:06 AM 
 
4. Enter the last four digits of your Social Security Number (xxxx) and Date of Birth (mm/dd/yyyy) as indicated and click Submit. You will be taken to the next sign-up page. 5. Create a [employer] ID. This will be your [employer] login ID and cannot be changed, so think of one that is secure and easy to remember. 6. Create a [employer] password. You can change your password at any time. 7. Enter your Password Reset Question and Answer. This can be used at a later time if you forget your password. 8. Enter your e-mail address. You will receive e-mail notification when new information is available in [address]. 9. Click Sign Up. You can now view and download portions of your medical record. 10. Click the Download Summary menu link to download a portable copy of your medical information. If you have questions, please visit the Frequently Asked Questions section of the [employer] website. Remember, [employer] is NOT to be used for urgent needs. For medical emergencies, dial 911. Now available from your iPhone and Android! Please provide this summary of care documentation to your next provider. Your primary care clinician is listed as NONE. If you have any questions after today's visit, please call [phone].

## 2017-10-23 ENCOUNTER — OFFICE VISIT (OUTPATIENT)
Dept: PAIN MANAGEMENT | Age: 67
End: 2017-10-23

## 2017-10-23 VITALS
BODY MASS INDEX: 20.67 KG/M2 | WEIGHT: 132 LBS | SYSTOLIC BLOOD PRESSURE: 174 MMHG | TEMPERATURE: 97.3 F | DIASTOLIC BLOOD PRESSURE: 84 MMHG | HEART RATE: 52 BPM | RESPIRATION RATE: 18 BRPM

## 2017-10-23 DIAGNOSIS — R07.81 RIB PAIN: ICD-10-CM

## 2017-10-23 DIAGNOSIS — G54.8 OTHER NERVE ROOT AND PLEXUS DISORDERS: ICD-10-CM

## 2017-10-23 DIAGNOSIS — G89.4 CHRONIC PAIN SYNDROME: Primary | ICD-10-CM

## 2017-10-23 DIAGNOSIS — Z79.899 ENCOUNTER FOR LONG-TERM (CURRENT) USE OF HIGH-RISK MEDICATION: ICD-10-CM

## 2017-10-23 DIAGNOSIS — G89.29 CHRONIC BILATERAL LOW BACK PAIN WITHOUT SCIATICA: ICD-10-CM

## 2017-10-23 DIAGNOSIS — M54.50 CHRONIC BILATERAL LOW BACK PAIN WITHOUT SCIATICA: ICD-10-CM

## 2017-10-23 LAB
ALCOHOL UR POC: NORMAL
AMPHETAMINES UR POC: NEGATIVE
BARBITURATES UR POC: NORMAL
BENZODIAZEPINES UR POC: NEGATIVE
BUPRENORPHINE UR POC: NEGATIVE
CANNABINOIDS UR POC: NEGATIVE
CARISOPRODOL UR POC: NORMAL
COCAINE UR POC: NEGATIVE
FENTANYL UR POC: NORMAL
MDMA/ECSTASY UR POC: NEGATIVE
METHADONE UR POC: NEGATIVE
METHAMPHETAMINE UR POC: NEGATIVE
METHYLPHENIDATE UR POC: NORMAL
OPIATES UR POC: NORMAL
OXYCODONE UR POC: NORMAL
PHENCYCLIDINE UR POC: NEGATIVE
PROPOXYPHENE UR POC: NORMAL
TRAMADOL UR POC: NORMAL
TRICYCLICS UR POC: NEGATIVE

## 2017-10-23 RX ORDER — ZOLPIDEM TARTRATE 12.5 MG/1
12.5 TABLET, FILM COATED, EXTENDED RELEASE ORAL
Qty: 30 TAB | Refills: 1 | Status: SHIPPED | OUTPATIENT
Start: 2017-10-23 | End: 2017-11-22

## 2017-10-23 RX ORDER — DIAZEPAM 5 MG/1
TABLET ORAL
Qty: 15 TAB | Refills: 2 | Status: SHIPPED | OUTPATIENT
Start: 2017-10-23 | End: 2018-04-19 | Stop reason: SDUPTHER

## 2017-10-23 RX ORDER — FENTANYL 50 UG/1
1 PATCH TRANSDERMAL
Qty: 15 PATCH | Refills: 0 | Status: SHIPPED | OUTPATIENT
Start: 2017-11-22 | End: 2018-01-18 | Stop reason: SDUPTHER

## 2017-10-23 RX ORDER — HYDROCODONE BITARTRATE AND ACETAMINOPHEN 10; 325 MG/1; MG/1
1 TABLET ORAL DAILY
Qty: 30 TAB | Refills: 0 | Status: SHIPPED | OUTPATIENT
Start: 2017-11-22 | End: 2018-01-18 | Stop reason: SDUPTHER

## 2017-10-23 RX ORDER — FENTANYL 50 UG/1
1 PATCH TRANSDERMAL
Qty: 15 PATCH | Refills: 0 | Status: SHIPPED | OUTPATIENT
Start: 2017-10-23 | End: 2018-01-18 | Stop reason: SDUPTHER

## 2017-10-23 RX ORDER — HYDROCODONE BITARTRATE AND ACETAMINOPHEN 10; 325 MG/1; MG/1
1 TABLET ORAL DAILY
Qty: 30 TAB | Refills: 0 | Status: SHIPPED | OUTPATIENT
Start: 2017-10-23 | End: 2018-01-18 | Stop reason: SDUPTHER

## 2017-10-23 RX ORDER — FENTANYL 50 UG/1
1 PATCH TRANSDERMAL
Qty: 15 PATCH | Refills: 0 | Status: SHIPPED | OUTPATIENT
Start: 2017-12-21 | End: 2018-01-20

## 2017-10-23 RX ORDER — HYDROCODONE BITARTRATE AND ACETAMINOPHEN 10; 325 MG/1; MG/1
1 TABLET ORAL DAILY
Qty: 30 TAB | Refills: 0 | Status: SHIPPED | OUTPATIENT
Start: 2017-12-21 | End: 2018-01-20

## 2017-10-23 NOTE — PROGRESS NOTES
Nursing Notes    Patient presents to the office today in follow-up. Patient rates her pain at 1/10 on the numerical pain scale. Reviewed medications with counts as follows:    Rx Date filled Qty Dispensed Pill Count Last Dose Short   Fentanyl 50 mcg 09/23/17 15 1 Sat pm no   ambien ER 12.5 mg 09/23/17 30 1 Last pm  no   Norco 10 mg 09/23/17 30 4 This am no   Valium 5 mg 09/23/17 15 4 Last week  no                    Comments:  Patient is here today for a follow up appt today she states her pain level today is a 1  She states she was seen at [facility] for the flu . She was given Cheratussin syrup on 09/12 she states she called the nurse line with in the time frame but there is nothing in the system. POC UDS was performed in office today    Any new labs or imaging since last appointment? YES Chest Xray at [facility] and labs done    Have you been to an emergency room (ER) or urgent care clinic since your last visit? YES     [facility]        Have you been hospitalized since your last visit? NO     If yes, where, when, and reason for visit? Have you seen or consulted any other health care providers outside of the [address] [place] [address]  since your last visit? YES [facility]      If yes, where, when, and reason for visit? Ms. Gutierrez has a reminder for a \"due or due soon\" health maintenance. I have asked that she contact her primary care provider for follow-up on this health maintenance.

## 2017-10-23 NOTE — PATIENT INSTRUCTIONS
PLAN / Pt Instructions:  1. Continue current plan with no evidence of addiction or diversion. Stable on current medication without adverse events. 2. Refill  fentanyl  50 mcg patch every 48 hours. 3. Refill hydrocodone 10/325 mg once daily as needed for pain. 4. Refill Valium 5 mg once daily as needed with one refill  5. Refill Ambien 12.5 ER once nightly. With one refill  6. Discussed risks of addiction, dependency, and opioid induced hyperalgesia.    7. Return to clinic in 3 months

## 2017-10-23 NOTE — PROGRESS NOTES
HISTORY OF PRESENT ILLNESS  Ashley Gutierrez is a 74 y.o. female    HPI: Ms. Gutierrez returns today for f/u of chronic bilateral rib pain caused by a fall several years ago with fractures to 2 of her ribs. No h/o of surgery. PT previously with no improvement.      This is my first visit with this patient. She continues unchanged since last visit. She reports she had a visit with her PCP in July. She reports having the flu on 10/12/17. She was treated at [facility] and is much improved today. We discussed her current condition and medications in detail today. She tolerates medications without side effects. Maxine Gutierrez reports no change in sleep. Pt does report constipation but is well controlled with over the counter medication. I will have her follow-up in 3 months for reassessment.      She is currently using Fentanyl 75 mcg Q 48hrs,  Norco 10/325mg every day PRN, Ibuprofen 800mg TID, Valium 5 mg every day, and Ambien 12.5 Qhs.  Medications are helping with pain control and quality of life. Her pain is 1/10 with medication and 7-8/10 without. Pt describes pain as aching and stabbing. The patient reports 70% relief with current medications. Aggravating factors are not identified. Relieved with rest, medication,  and avoiding painful activities. Current treatment is helping to improve general activity, mood, walking, sleep, enjoyment of life      Pain Meds and Quality Of Life have been reviewed. Nonpharmacologic therapy and non-opioid pharmacologic therapy were considered. If opioid therapy is prescribed, this is only if the expected benefits are anticipated to outweigh risks. She  is otherwise doing well with no other complaints today. She denies any adverse events including nausea, vomiting, dizziness, increased constipation, hallucinations, or seizures. The patient reports functional improvement and QOL with pain medication.        Vitals:    10/23/17 1336   BP: 174/84   Pulse: (!) 52   Resp: 18   Temp: 97.3 °F (36.3 °C)   Weight: 59.9 kg (132 lb)   PainSc:   1   PainLoc: Rib Cage         Allergies   Allergen Reactions    Ceftin [Cefuroxime Axetil] Itching    Norvasc [Amlodipine] Not Reported This Time    Pcn [Penicillins] Hives    Sulfa (Sulfonamide Antibiotics) Other (comments)     GI problems       Past Surgical History:   Procedure Laterality Date    HX APPENDECTOMY      HX CHOLECYSTECTOMY  2017    HX GYN      Ovarian cystectomy    HX GYN      Stillbirths, miscarriage,     HX OOPHORECTOMY      HX THORACOTOMY      HX TONSILLECTOMY      HX TUBAL LIGATION           ROS   Constitutional: Negative for chills. HENT: Negative for congestion, ear pain and sore throat (occasioinal). Eyes: Negative. Respiratory: Positive for wheezing (occasional). Gastrointestinal: Negative for constipation and heartburn. Musculoskeletal: Negative for falls, joint pain, myalgias and neck pain. Skin: Negative for itching and rash. Neurological: Negative for speech change and seizures. Endo/Heme/Allergies: Negative for environmental allergies. Psychiatric/Behavioral: Positive for depression. Negative for suicidal ideas. The patient is nervous/anxious. The patient does not have insomnia.         Physical Exam   Constitutional: She is oriented to person, place, and time. She appears well-developed and well-nourished. HENT:   Head: Normocephalic. Eyes: EOM are normal.   Neck: Normal range of motion. Pulmonary/Chest: Effort normal. No respiratory distress. Musculoskeletal: She exhibits tenderness. She exhibits no edema. Neurological: She is alert and oriented to person, place, and time. Psychiatric: She has a normal mood and affect. Her behavior is normal. Judgment and thought content normal.   Nursing note and vitals reviewed. ASSESSMENT:    1. Chronic pain syndrome    2. Rib pain    3. Other nerve root and plexus disorders    4. Chronic bilateral low back pain without sciatica    5. Encounter for long-term (current) use of high-risk medication        [place] Prescription Monitoring Program was reviewed which does not demonstrate aberrancies and/or inconsistencies with regard to the historical prescribing of controlled medications to this patient by other providers. Medications were brought to visit today. Pill count was appropriate. When possible, non-drug therapy for chronic pain should be used as a first-line treatment. Physical therapy exercise regimens, chiropractic manipulation, meditation relaxation techniques, cognitive behavior therapy, acupuncture, yoga, Juan J Chi,  transcutaneous electrical nerve stimulation (TENS), and application of moist heat can help alleviate pain . Explained that realistic expectations and goals with chronic pain management are to maximize function and minimize pain with the understanding that limitations will exist both in the extent of relief that she may achieve, as well as thresholds of mg strengths that we will not exceed. Our role is to help the patient better cope with chronic pain utilizng a multimodal approach. The patients condition and plan were discussed. All questions were answered. The patient agrees with the plan. PLAN / Pt Instructions:  1. Continue current plan with no evidence of addiction or diversion. 2. Stable on current medication without adverse events. 3. Refill  fentanyl  50 mcg patch every 48 hours. 4. Refill hydrocodone 10/325 mg once daily as needed for pain. 5. Refill Valium 5 mg once daily as needed with one refill  6. Refill Ambien 12.5 ER once nightly. With one refill  7. Discussed risks of addiction, dependency, and opioid induced hyperalgesia. 8. Return to clinic in 3 months        Prescription monitoring program reviewed. The patient  should keep track of total Tylenol intake and make sure liver function tests have been checked with primary care physician. POC UDS today.      Pain medications prescribed with the objective of pain relief and improved physical and psychosocial function in this patient. DISPOSITION  · Counseled patient on proper use of prescribed medications. · Counseled patient about chronic medical conditions and their relationship to anxiety and depression and recommended mental health support as needed. · Reviewed with patient self-help tools, home exercise, and lifestyle changes to assist the patient in self-management of symptoms. · Reviewed with patient the treatment plan, goals of treatment plan, and limitations of treatment plan, to include the potential for side effects from medications and procedures. If side effects occur, it is the responsibility of the patient to inform the clinic so that a change in the treatment plan can be made in a safe manner. The patient is advised that stopping prescribed medication may cause an increase in symptoms and possible medication withdrawal symptoms. The patient is informed an emergency room evaluation may be necessary if this occurs. Spent 25 minutes with patient today reviewing the treatment plan, goals of treatment plan, and limitations of the treatment plan, to include the potential for side effects from medications and procedures. More than 50% of the visit time was spent counseling the patient. Kyung Heart PA-C 10/23/2017        Note: Please excuse any typographical errors. Voice recognition software was used for this note and may cause mistakes.

## 2017-10-23 NOTE — MR AVS SNAPSHOT
Visit Information Date & Time Provider Department Dept. Phone Encounter #  
 10/23/2017  1:20 PM Marylen Saras [address] for Pain Management [phone] Follow-up Instructions Return in about 3 months (around 1/23/2018). Upcoming Health Maintenance Date Due Hepatitis C Screening 1950 DTaP/Tdap/Td series (1 - Tdap) 8/9/1973 BREAST CANCER SCRN MAMMOGRAM 8/9/2002 FOBT Q 1 YEAR AGE 50-75 8/9/2002 ZOSTER VACCINE AGE 60> 6/9/2012 INFLUENZA AGE 9 TO ADULT 8/1/2017 GLAUCOMA SCREENING Q2Y 8/9/2017 OSTEOPOROSIS SCREENING (DEXA) 8/9/2017 Pneumococcal 65+ Low/Medium Risk (1 of 2 - PCV13) 8/9/2017 MEDICARE YEARLY EXAM 8/9/2017 Allergies as of 10/23/2017  Review Complete On: 10/23/2017 By: Masoud Caicedo PA-C Severity Noted Reaction Type Reactions Ceftin [Cefuroxime Axetil]    Itching Norvasc [Amlodipine]    Not Reported This Time  
 Pcn [Penicillins]    Hives Sulfa (Sulfonamide Antibiotics)    Other (comments) GI problems Current Immunizations  Never Reviewed No immunizations on file. Not reviewed this visit Vitals BP Pulse Temp Resp Weight(growth percentile) BMI  
 174/84 (!) 52 97.3 °F (36.3 °C) 18 132 lb (59.9 kg) 20.67 kg/m2 OB Status Smoking Status Postmenopausal Current Every Day Smoker BMI and BSA Data Body Mass Index Body Surface Area  
 20.67 kg/m 2 1.68 m 2 Preferred Pharmacy Pharm[facility] Name Phone [facility], [address] [place] [address] [phone] Your Updated Medication List  
  
   
This list is accurate as of: 10/23/17  2:40 PM.  Always use your most recent med list.  
  
  
  
  
 * AMBIEN 10 mg tablet Generic drug:  zolpidem Take  by mouth nightly as needed. * zolpidem CR 12.5 mg tablet Commonly known as:  AMBIEN CR  
 Take 1 Tab by mouth nightly as needed for Sleep for up to 30 days. Max Daily Amount: 12.5 mg. Indications: Insomnia  
  
 benzonatate 200 mg capsule Commonly known as:  TESSALON Take 200 mg by mouth three (3) times daily as needed. BONIVA PO Take  by mouth. COZAAR 25 mg tablet Generic drug:  losartan Take  by mouth daily. * diazePAM 5 mg tablet Commonly known as:  VALIUM Take one tab po qd prn muscle spasms/sleep. Indications: MUSCLE SPASM * diazePAM 5 mg tablet Commonly known as:  VALIUM Take one tab po qd prn muscle spasms/sleep. Indications: MUSCLE SPASM * diazePAM 5 mg tablet Commonly known as:  VALIUM Take one tab po qd prn muscle spasms/sleep. Indications: MUSCLE SPASM * diazePAM 5 mg tablet Commonly known as:  VALIUM Take one tab po qd prn muscle spasms/sleep. Due to fill 01/28/17, 02/26/17, 03/27/17  Indications: Muscle Spasm * fentaNYL 50 mcg/hr PATCH Commonly known as:  DURAGESIC  
1 Patch by TransDERmal route every fourty-eight (48) hours. * fentaNYL 50 mcg/hr PATCH Commonly known as:  DURAGESIC  
1 Patch by TransDERmal route every fourty-eight (48) hours for 30 days. For chronic pain. Apply 1 patch to upper body q 48 hours for chronic pain. Mylan brand only  Indications: Chronic Pain with Opioid Tolerance, SEVERE PAIN WITH OPIOID TOLERANCE  
  
 * fentaNYL 50 mcg/hr PATCH Commonly known as:  DURAGESIC  
1 Patch by TransDERmal route every fourty-eight (48) hours for 30 days. For chronic pain. Apply 1 patch to upper body q 48 hours for chronic pain. Mylan brand only  Indications: Chronic Pain with Opioid Tolerance, SEVERE PAIN WITH OPIOID TOLERANCE Start taking on:  11/22/2017 * fentaNYL 50 mcg/hr PATCH Commonly known as:  DURAGESIC  
1 Patch by TransDERmal route every fourty-eight (48) hours for 30 days. For chronic pain. Apply 1 patch to upper body q 48 hours for chronic pain. Mylan brand only  Indications: Chronic Pain with Opioid Tolerance, SEVERE PAIN WITH OPIOID TOLERANCE Start taking on:  12/21/2017  
  
 guaiFENesin 100 mg/5 mL liquid Commonly known as:  ROBITUSSIN Take 200 mg by mouth three (3) times daily as needed for Cough. hydroCHLOROthiazide 12.5 mg tablet Commonly known as:  HYDRODIURIL Take 12.5 mg by mouth daily. * HYDROcodone-acetaminophen  mg per tablet Commonly known as:  [address] Take 1 Tab by mouth three (3) times daily. Prn activity pain (due to fill 3/2/12, 4/1/12, 4/30/12) * HYDROcodone-acetaminophen  mg tablet Commonly known as:  [address] Take 1 Tab by mouth daily for 30 days. For breakthrough pain. Indications: Pain  
  
 * HYDROcodone-acetaminophen  mg tablet Commonly known as:  [address] Take 1 Tab by mouth daily for 30 days. For breakthrough pain. Indications: Pain Start taking on:  11/22/2017  
  
 * HYDROcodone-acetaminophen  mg tablet Commonly known as:  [address] Take 1 Tab by mouth daily for 30 days. For breakthrough pain. Indications: Pain Start taking on:  12/21/2017  
  
 metoprolol succinate 25 mg XL tablet Commonly known as:  TOPROL-XL Take  by mouth daily. MOTRIN  mg tablet Generic drug:  ibuprofen Take  by mouth.  
  
 naloxone 4 mg/actuation nasal spray Commonly known as:  NARCAN  
4 mg by Nasal route as needed for up to 2 doses. Indications: OPIOID TOXICITY TYLENOL PO Take  by mouth. VENTOLIN HFA 90 mcg/actuation inhaler Generic drug:  albuterol Take 1 Puff by inhalation. VITAMIN D2 PO Take  by mouth. XYZAL 5 mg tablet Generic drug:  levocetirizine Take  by mouth daily. * Notice: This list has 14 medication(s) that are the same as other medications prescribed for you. Read the directions carefully, and ask your doctor or other care provider to review them with you. Prescriptions Printed Refills fentaNYL (DURAGESIC) 50 mcg/hr PATCH 0 Starting on: 2017 Si Patch by TransDERmal route every fourty-eight (48) hours for 30 days. For chronic pain. Apply 1 patch to upper body q 48 hours for chronic pain. Mylan brand only  Indications: Chronic Pain with Opioid Tolerance, SEVERE PAIN WITH OPIOID TOLERANCE Class: Print Route: TransDERmal  
 fentaNYL (DURAGESIC) 50 mcg/hr PATCH 0 Starting on: 2017 Si Patch by TransDERmal route every fourty-eight (48) hours for 30 days. For chronic pain. Apply 1 patch to upper body q 48 hours for chronic pain. Mylan brand only  Indications: Chronic Pain with Opioid Tolerance, SEVERE PAIN WITH OPIOID TOLERANCE Class: Print Route: TransDERmal  
 fentaNYL (DURAGESIC) 50 mcg/hr PATCH 0 Si Patch by TransDERmal route every fourty-eight (48) hours for 30 days. For chronic pain. Apply 1 patch to upper body q 48 hours for chronic pain. Mylan brand only  Indications: Chronic Pain with Opioid Tolerance, SEVERE PAIN WITH OPIOID TOLERANCE Class: Print Route: TransDERmal  
 HYDROcodone-acetaminophen (NORCO)  mg tablet 0 Starting on: 2017 Sig: Take 1 Tab by mouth daily for 30 days. For breakthrough pain. Indications: Pain Class: Print Route: Oral  
 HYDROcodone-acetaminophen (NORCO)  mg tablet 0 Starting on: 2017 Sig: Take 1 Tab by mouth daily for 30 days. For breakthrough pain. Indications: Pain Class: Print Route: Oral  
 HYDROcodone-acetaminophen (NORCO)  mg tablet 0 Sig: Take 1 Tab by mouth daily for 30 days. For breakthrough pain. Indications: Pain Class: Print Route: Oral  
 zolpidem CR (AMBIEN CR) 12.5 mg tablet 1 Sig: Take 1 Tab by mouth nightly as needed for Sleep for up to 30 days. Max Daily Amount: 12.5 mg. Indications: Insomnia Class: Print  Route: Oral  
 diazePAM (VALIUM) 5 mg tablet 2  
 Sig: Take one tab po qd prn muscle spasms/sleep. Due to fill 01/28/17, 02/26/17, 03/27/17  Indications: Muscle Spasm Class: Print Follow-up Instructions Return in about 3 months (around 1/23/2018). Patient Instructions PLAN / Pt Instructions: 1. Continue current plan with no evidence of addiction or diversion. Stable on current medication without adverse events. 2. Refill  fentanyl  50 mcg patch every 48 hours. 3. Refill hydrocodone 10/325 mg once daily as needed for pain. 4. Refill Valium 5 mg once daily as needed with one refill 5. Refill Ambien 12.5 ER once nightly. With one refill 6. Discussed risks of addiction, dependency, and opioid induced hyperalgesia. 7. Return to clinic in 3 months Introducing [facility]! [facility] introduces [employer] patient portal. Now you can access parts of your medical record, email your doctor's office, and request medication refills online. 1. In your internet browser, go to [email or web address] 2. Click on the First Time User? Click Here link in the Sign In box. You will see the New Member Sign Up page. 3. Enter your [employer] Access Code exactly as it appears below. You will not need to use this code after youve completed the sign-up process. If you do not sign up before the expiration date, you must request a new code. · [employer] Access Code: [email or web address] Expires: 1/21/2018  2:40 PM 
 
4. Enter the last four digits of your Social Security Number (xxxx) and Date of Birth (mm/dd/yyyy) as indicated and click Submit. You will be taken to the next sign-up page. 5. Create a [employer] ID. This will be your [employer] login ID and cannot be changed, so think of one that is secure and easy to remember. 6. Create a FrameBlast[employer] password. You can change your password at any time. 7. Enter your Password Reset Question and Answer. This can be used at a later time if you forget your password. 8. Enter your e-mail address. You will receive e-mail notification when new information is available in [address] 9. Click Sign Up. You can now view and download portions of your medical record. 10. Click the Download Summary menu link to download a portable copy of your medical information. If you have questions, please visit the Frequently Asked Questions section of the [employer] website. Remember, [employer] is NOT to be used for urgent needs. For medical emergencies, dial 911. Now available from your iPhone and Android! Please provide this summary of care documentation to your next provider. Your primary care clinician is listed as NONE. If you have any questions after today's visit, please call [phone].

## 2018-01-18 ENCOUNTER — OFFICE VISIT (OUTPATIENT)
Dept: PAIN MANAGEMENT | Age: 68
End: 2018-01-18

## 2018-01-18 VITALS
HEIGHT: 67 IN | RESPIRATION RATE: 16 BRPM | HEART RATE: 55 BPM | WEIGHT: 132 LBS | BODY MASS INDEX: 20.72 KG/M2 | DIASTOLIC BLOOD PRESSURE: 87 MMHG | TEMPERATURE: 97.3 F | SYSTOLIC BLOOD PRESSURE: 173 MMHG

## 2018-01-18 DIAGNOSIS — R07.81 RIB PAIN: ICD-10-CM

## 2018-01-18 DIAGNOSIS — Z79.899 ENCOUNTER FOR LONG TERM BENZODIAZEPINE THERAPY: ICD-10-CM

## 2018-01-18 DIAGNOSIS — G89.4 CHRONIC PAIN SYNDROME: ICD-10-CM

## 2018-01-18 DIAGNOSIS — G89.29 CHRONIC BILATERAL LOW BACK PAIN WITHOUT SCIATICA: Primary | ICD-10-CM

## 2018-01-18 DIAGNOSIS — M54.50 CHRONIC BILATERAL LOW BACK PAIN WITHOUT SCIATICA: Primary | ICD-10-CM

## 2018-01-18 RX ORDER — HYDROCODONE BITARTRATE AND ACETAMINOPHEN 10; 325 MG/1; MG/1
1 TABLET ORAL DAILY
Qty: 30 TAB | Refills: 0 | Status: SHIPPED | OUTPATIENT
Start: 2018-03-18 | End: 2018-04-19 | Stop reason: SDUPTHER

## 2018-01-18 RX ORDER — HYDROCODONE BITARTRATE AND ACETAMINOPHEN 10; 325 MG/1; MG/1
1 TABLET ORAL DAILY
Qty: 30 TAB | Refills: 0 | Status: SHIPPED | OUTPATIENT
Start: 2018-01-20 | End: 2018-02-19

## 2018-01-18 RX ORDER — IBUPROFEN 400 MG/1
400 TABLET ORAL
Qty: 60 TAB | Refills: 1 | Status: SHIPPED | OUTPATIENT
Start: 2018-01-18 | End: 2018-02-17

## 2018-01-18 RX ORDER — FENTANYL 50 UG/1
1 PATCH TRANSDERMAL
Qty: 15 PATCH | Refills: 0 | Status: SHIPPED | OUTPATIENT
Start: 2018-03-18 | End: 2018-04-17

## 2018-01-18 RX ORDER — FENTANYL 50 UG/1
1 PATCH TRANSDERMAL
Qty: 15 PATCH | Refills: 0 | Status: SHIPPED | OUTPATIENT
Start: 2018-01-20 | End: 2018-02-19

## 2018-01-18 RX ORDER — HYDROCODONE BITARTRATE AND ACETAMINOPHEN 10; 325 MG/1; MG/1
1 TABLET ORAL DAILY
Qty: 30 TAB | Refills: 0 | Status: SHIPPED | OUTPATIENT
Start: 2018-02-19 | End: 2018-03-21

## 2018-01-18 RX ORDER — FENTANYL 50 UG/1
1 PATCH TRANSDERMAL
Qty: 15 PATCH | Refills: 0 | Status: SHIPPED | OUTPATIENT
Start: 2018-02-19 | End: 2018-03-21

## 2018-01-18 NOTE — PROGRESS NOTES
Nursing Notes    Patient presents to the office today in follow-up. Patient rates her pain at 1/10 on the numerical pain scale. Reviewed medications with counts as follows:    Rx Date filled Qty Dispensed Pill Count Last Dose Short   Fentanyl 50 mcg/hr patch  12/21/17 15 3 + 1 wearing now 1/16/18 no   Norco  mg tab 12/21/17 30 4 today no   Valium 5 mg tab 12/21/17 15 12 1/16/18 no   Ambien Tart ER 12.5 mg tab 12/21/17 30 8 1/17/18 no                  Comments:     POC UDS was not performed in office today    Any new labs or imaging since last appointment? YES, Lab work. Have you been to an emergency room (ER) or urgent care clinic since your last visit? NO            Have you been hospitalized since your last visit? NO     If yes, where, when, and reason for visit? Have you seen or consulted any other health care providers outside of the [address] [place] [address]  since your last visit? YES, PCP, Pulmonologist, and Psychiatry. If yes, where, when, and reason for visit? HM deferred to pcp.

## 2018-01-18 NOTE — MR AVS SNAPSHOT
[address] [address] 
[phone] Patient: Azucena Sinclair Case MRN: [id number] Visit Information Date & Time Provider Department Dept. Phone Encounter #  
 1/18/2018 11:20 AM Gonsalo Cortes [address] [place] [address] for Pain Management [phone] [id number] Follow-up Instructions Return in about 3 months (around 4/18/2018). Upcoming Health Maintenance Date Due Hepatitis C Screening 1950 DTaP/Tdap/Td series (1 - Tdap) 8/9/1973 BREAST CANCER SCRN MAMMOGRAM 8/9/2002 FOBT Q 1 YEAR AGE 50-75 8/9/2002 ZOSTER VACCINE AGE 60> 6/9/2012 Influenza Age 5 to Adult 8/1/2017 GLAUCOMA SCREENING Q2Y 8/9/2017 OSTEOPOROSIS SCREENING (DEXA) 8/9/2017 Pneumococcal 65+ Low/Medium Risk (1 of 2 - PCV13) 8/9/2017 MEDICARE YEARLY EXAM 8/9/2017 Allergies as of 1/18/2018  Review Complete On: 1/18/2018 By: Raymond Osborn PA-C Severity Noted Reaction Type Reactions Ceftin [Cefuroxime Axetil]    Itching Lamotrigine  01/18/2018    Other (comments) Increased Tremors Norvasc [Amlodipine]    Not Reported This Time  
 Pcn [Penicillins]    Hives Procardia [Nifedipine]  01/18/2018    Other (comments) Headache, Palpitations, and kidney pain  
 Sulfa (Sulfonamide Antibiotics)    Other (comments) GI problems Trazodone  01/18/2018    Palpitations Current Immunizations  Never Reviewed No immunizations on file. Not reviewed this visit You Were Diagnosed With   
  
 Codes Comments Chronic bilateral low back pain without sciatica    -  Primary ICD-10-CM: M54.5, G89.29 ICD-9-CM: 724.2, 338.29 Rib pain     ICD-10-CM: R07.81 ICD-9-CM: 786.50 Chronic pain syndrome     ICD-10-CM: G89.4 ICD-9-CM: 338.4 Encounter for long term benzodiazepine therapy     ICD-10-CM: Z79.899 ICD-9-CM: V58.69 Vitals BP Pulse Temp Resp Height(growth percentile) Weight(growth percentile) 173/87 (BP 1 Location: Left arm, BP Patient Position: Sitting) (!) 55 97.3 °F (36.3 °C) (Oral) 16 5' 7\" (1.702 m) 132 lb (59.9 kg) BMI OB Status Smoking Status 20.67 kg/m2 Postmenopausal Current Every Day Smoker Vitals History BMI and BSA Data Body Mass Index Body Surface Area  
 20.67 kg/m 2 1.68 m 2 Preferred Pharmacy Pharmacy Name Phone [facility], [address] [place] [address] [phone] Your Updated Medication List  
  
   
This list is accurate as of: 1/18/18 12:33 PM.  Always use your most recent med list.  
  
  
  
  
 AMBIEN 10 mg tablet Generic drug:  zolpidem Take  by mouth nightly as needed. benzonatate 200 mg capsule Commonly known as:  TESSALON Take 200 mg by mouth three (3) times daily as needed. BONIVA PO Take  by mouth. COZAAR 25 mg tablet Generic drug:  losartan Take  by mouth daily. * diazePAM 5 mg tablet Commonly known as:  VALIUM Take one tab po qd prn muscle spasms/sleep. Indications: MUSCLE SPASM * diazePAM 5 mg tablet Commonly known as:  VALIUM Take one tab po qd prn muscle spasms/sleep. Due to fill 01/28/17, 02/26/17, 03/27/17  Indications: Muscle Spasm * fentaNYL 50 mcg/hr PATCH Commonly known as:  DURAGESIC  
1 Patch by TransDERmal route every fourty-eight (48) hours. * fentaNYL 50 mcg/hr PATCH Commonly known as:  DURAGESIC  
1 Patch by TransDERmal route every fourty-eight (48) hours for 30 days. For chronic pain. Apply 1 patch to upper body q 48 hours for chronic pain. Mylan brand only  Indications: Chronic Pain with Opioid Tolerance, SEVERE PAIN WITH OPIOID TOLERANCE  
  
 * fentaNYL 50 mcg/hr PATCH Commonly known as:  DURAGESIC  
1 Patch by TransDERmal route every fourty-eight (48) hours for 30 days. For chronic pain. Apply 1 patch to upper body q 48 hours for chronic pain. Mylan brand only  Indications: Chronic Pain with Opioid Tolerance, SEVERE PAIN WITH OPIOID TOLERANCE Start taking on:  1/20/2018 * fentaNYL 50 mcg/hr PATCH Commonly known as:  DURAGESIC  
1 Patch by TransDERmal route every fourty-eight (48) hours for 30 days. For chronic pain. Apply 1 patch to upper body q 48 hours for chronic pain. Mylan brand only  Indications: Chronic Pain with Opioid Tolerance, SEVERE PAIN WITH OPIOID TOLERANCE Start taking on:  2/19/2018 * fentaNYL 50 mcg/hr PATCH Commonly known as:  DURAGESIC  
1 Patch by TransDERmal route every fourty-eight (48) hours for 30 days. For chronic pain. Apply 1 patch to upper body q 48 hours for chronic pain. Mylan brand only  Indications: Chronic Pain with Opioid Tolerance, SEVERE PAIN WITH OPIOID TOLERANCE Start taking on:  3/18/2018  
  
 guaiFENesin 100 mg/5 mL liquid Commonly known as:  ROBITUSSIN Take 200 mg by mouth three (3) times daily as needed for Cough. hydroCHLOROthiazide 12.5 mg tablet Commonly known as:  HYDRODIURIL Take 12.5 mg by mouth daily. * HYDROcodone-acetaminophen  mg per tablet Commonly known as:  Marolyn Albright Take 1 Tab by mouth three (3) times daily. Prn activity pain (due to fill 3/2/12, 4/1/12, 4/30/12) * HYDROcodone-acetaminophen  mg tablet Commonly known as:  Wilian Staff Take 1 Tab by mouth daily for 30 days. For breakthrough pain. Indications: Pain  
  
 * HYDROcodone-acetaminophen  mg tablet Commonly known as:  Wilian Staff Take 1 Tab by mouth daily for 30 days. For breakthrough pain. Indications: Pain Start taking on:  1/20/2018  
  
 * HYDROcodone-acetaminophen  mg tablet Commonly known as:  Wilian Staff Take 1 Tab by mouth daily for 30 days. For breakthrough pain. Indications: Pain Start taking on:  2/19/2018  
  
 * HYDROcodone-acetaminophen  mg tablet Commonly known as:  [address] Take 1 Tab by mouth daily for 30 days. For breakthrough pain. Indications: Pain Start taking on:  3/18/2018  
  
 metoprolol succinate 25 mg XL tablet Commonly known as:  TOPROL-XL Take  by mouth daily. * MOTRIN  mg tablet Generic drug:  ibuprofen Take 800 mg by mouth. * ibuprofen 400 mg tablet Commonly known as:  MOTRIN Take 1 Tab by mouth two (2) times daily as needed for up to 30 days. Prn pain  
  
 naloxone 4 mg/actuation nasal spray Commonly known as:  NARCAN  
4 mg by Nasal route as needed for up to 2 doses. Indications: OPIOID TOXICITY TYLENOL PO Take  by mouth. VENTOLIN HFA 90 mcg/actuation inhaler Generic drug:  albuterol Take 1 Puff by inhalation. VITAMIN D2 PO Take  by mouth. XYZAL 5 mg tablet Generic drug:  levocetirizine Take  by mouth daily. * Notice: This list has 14 medication(s) that are the same as other medications prescribed for you. Read the directions carefully, and ask your doctor or other care provider to review them with you. Prescriptions Printed Refills  
 fentaNYL (DURAGESIC) 50 mcg/hr PATCH 0 Starting on: 3/18/2018 Si Patch by TransDERmal route every fourty-eight (48) hours for 30 days. For chronic pain. Apply 1 patch to upper body q 48 hours for chronic pain. Mylan brand only  Indications: Chronic Pain with Opioid Tolerance, SEVERE PAIN WITH OPIOID TOLERANCE Class: Print Route: TransDERmal  
 fentaNYL (DURAGESIC) 50 mcg/hr PATCH 0 Starting on: 2018 Si Patch by TransDERmal route every fourty-eight (48) hours for 30 days. For chronic pain. Apply 1 patch to upper body q 48 hours for chronic pain. Mylan brand only  Indications: Chronic Pain with Opioid Tolerance, SEVERE PAIN WITH OPIOID TOLERANCE Class: Print Route: TransDERmal  
 fentaNYL (DURAGESIC) 50 mcg/hr PATCH 0 Starting on: 2018 Si Patch by TransDERmal route every fourty-eight (48) hours for 30 days. For chronic pain. Apply 1 patch to upper body q 48 hours for chronic pain. Mylan brand only  Indications: Chronic Pain with Opioid Tolerance, SEVERE PAIN WITH OPIOID TOLERANCE Class: Print Route: TransDERmal  
 HYDROcodone-acetaminophen (NORCO)  mg tablet 0 Starting on: 3/18/2018 Sig: Take 1 Tab by mouth daily for 30 days. For breakthrough pain. Indications: Pain Class: Print Route: Oral  
 HYDROcodone-acetaminophen (NORCO)  mg tablet 0 Starting on: 2018 Sig: Take 1 Tab by mouth daily for 30 days. For breakthrough pain. Indications: Pain Class: Print Route: Oral  
 HYDROcodone-acetaminophen (NORCO)  mg tablet 0 Starting on: 2018 Sig: Take 1 Tab by mouth daily for 30 days. For breakthrough pain. Indications: Pain Class: Print Route: Oral  
  
Prescriptions Sent to Pharmacy Refills  
 ibuprofen (MOTRIN) 400 mg tablet 1 Sig: Take 1 Tab by mouth two (2) times daily as needed for up to 30 days. Prn pain  
 Class: Normal  
 Pharmacy: [address] #: [phone] Route: Oral  
  
Follow-up Instructions Return in about 3 months (around 2018). Introducing \[facility]\"" [facility]! [facility] introduces [employer] patient portal. Now you can access parts of your medical record, email your doctor's office, and request medication refills online. 1. In your internet browser, go to [email or web address] 2. Click on the First Time User? Click Here link in the Sign In box. You will see the New Member Sign Up page. 3. Enter your [employer] Access Code exactly as it appears below. You will not need to use this code after youve completed the sign-up process. If you do not sign up before the expiration date, you must request a new code. · [employer] Access Code: [email or web address] Expires: 1/21/2018  1:40 PM 
 
4. Enter the last four digits of your Social Security Number (xxxx) and Date of Birth (mm/dd/yyyy) as indicated and click Submit. You will be taken to the next sign-up page. 5. Create a [employer] ID. This will be your [employer] login ID and cannot be changed, so think of one that is secure and easy to remember. 6. Create a [employer] password. You can change your password at any time. 7. Enter your Password Reset Question and Answer. This can be used at a later time if you forget your password. 8. Enter your e-mail address. You will receive e-mail notification when new information is available in [address] 9. Click Sign Up. You can now view and download portions of your medical record. 10. Click the Download Summary menu link to download a portable copy of your medical information. If you have questions, please visit the Frequently Asked Questions section of the [employer] website. Remember, [employer] is NOT to be used for urgent needs. For medical emergencies, dial 911. Now available from your iPhone and Android! Please provide this summary of care documentation to your next provider. Your primary care clinician is listed as NONE. If you have any questions after today's visit, please call [phone].

## 2018-01-18 NOTE — PROGRESS NOTES
HISTORY OF PRESENT ILLNESS  Ezio Gutierrez is a 74 y.o. female    Ms. Gutierrez returns today for f/u of chronic bilateral rib pain caused by a fall several years ago with fractures to 2 of her ribs. No h/o of surgery. PT previously with no improvement.       She continues unchanged since last visit. She does regularly see her primary care provider. Last visit was in July. She also continues to see her therapist and currently is having her Valium 5 mg once a day and Ambien 12.5 mg ER once nightly filled by that provider. We discussed her current condition and medications in detail today. She tolerates medications without side effects. Maxine Gutierrez reports no change in sleep. Pt does report constipation but is well controlled with over the counter medication.        She is currently using Fentanyl 50 mcg every 48hrs,  Norco 10/325mg every day PRN, Ibuprofen 800mg TID, Valium 5 mg every day, and Ambien 12.5 Qhs by another provider Medications are helping with pain control and quality of life. Her pain is 1/10 with medication and 7-8/10 without.  Pt describes pain as aching and stabbing. The patient reports 70% relief with current medications.  Aggravating factors are not identified. Relieved with rest, medication,  and avoiding painful activities. Current treatment is helping to improve general activity, mood, walking, sleep, enjoyment of life     Measuring clinical outcomes of chronic pain patients: score 8/28; the lower the number the better the outcome. Pain Meds and Quality Of Life have been reviewed. Nonpharmacologic therapy and non-opioid pharmacologic therapy were considered. If opioid therapy is prescribed, this is only if the expected benefits are anticipated to outweigh risks. She  is otherwise doing well with no other complaints today. She denies any adverse events including nausea, vomiting, dizziness, increased constipation, hallucinations, or seizures.      The patient reports functional improvement and QOL with pain medication. Vitals:    18 1146   BP: 173/87   Pulse: (!) 55   Resp: 16   Temp: 97.3 °F (36.3 °C)   TempSrc: Oral   Weight: 59.9 kg (132 lb)   Height: 5' 7\" (1.702 m)   PainSc:   1   PainLoc: Rib Cage         Allergies   Allergen Reactions    Ceftin [Cefuroxime Axetil] Itching    Lamotrigine Other (comments)     Increased Tremors    Norvasc [Amlodipine] Not Reported This Time    Pcn [Penicillins] Hives    Procardia [Nifedipine] Other (comments)     Headache, Palpitations, and kidney pain    Sulfa (Sulfonamide Antibiotics) Other (comments)     GI problems    Trazodone Palpitations       Past Surgical History:   Procedure Laterality Date    HX APPENDECTOMY      HX CHOLECYSTECTOMY  2017    HX GYN      Ovarian cystectomy    HX GYN      Stillbirths, miscarriage,     HX OOPHORECTOMY      HX THORACOTOMY      HX TONSILLECTOMY      HX TUBAL LIGATION         ROS  Constitutional: Negative for chills. HENT: Negative for congestion, ear pain and sore throat (occasioinal).    Eyes: Negative.    Respiratory: Positive for wheezing (occasional).    Gastrointestinal: Negative for constipation and heartburn. Musculoskeletal: Negative for falls, joint pain, myalgias and neck pain. Skin: Negative for itching and rash. Neurological: Negative for speech change and seizures. Endo/Heme/Allergies: Negative for environmental allergies. Psychiatric/Behavioral: Positive for depression. Negative for suicidal ideas. The patient is nervous/anxious. The patient does not have insomnia.      Physical Exam   Constitutional: She is oriented to person, place, and time. She appears well-developed and well-nourished. HENT:   Head: Normocephalic. Eyes: EOM are normal.   Neck: Normal range of motion. Pulmonary/Chest: Effort normal. No respiratory distress. Musculoskeletal: She exhibits tenderness. She exhibits no edema.    Neurological: She is alert and oriented to person, place, and time. Psychiatric: She has a normal mood and affect. Her behavior is normal. Judgment and thought content normal.   Nursing note and vitals reviewed. ASSESSMENT:    1. Chronic bilateral low back pain without sciatica    2. Rib pain    3. Chronic pain syndrome    4. Encounter for long term benzodiazepine therapy        [employer] Monitoring Program was reviewed which does not demonstrate aberrancies and/or inconsistencies with regard to the historical prescribing of controlled medications to this patient by other providers. Medications were brought to visit today. Pill count was appropriate. When possible, non-drug therapy for chronic pain should be used as a first-line treatment. Physical therapy exercise regimens, chiropractic manipulation, meditation relaxation techniques, cognitive behavior therapy, acupuncture, yoga, Juan J Chi,  transcutaneous electrical nerve stimulation (TENS), and application of moist heat can help alleviate pain . Explained that realistic expectations and goals with chronic pain management are to maximize function and minimize pain with the understanding that limitations will exist both in the extent of relief that she may achieve, as well as thresholds of mg strengths that we will not exceed. Our role is to help the patient better cope with chronic pain utilizng a multimodal approach. The patients condition and plan were discussed. All questions were answered. The patient agrees with the plan. PLAN / Pt Instructions:  1. Continue current plan with no evidence of addiction or diversion. 2. Stable on current medication without adverse events.    3. Refill  fentanyl  50 mcg patch every 48 hours. 4. Refill hydrocodone/acetaminophen 10/325 mg once daily as needed for pain. 5. Continue Valium 5 mg once daily as needed  6. Continue Ambien 12.5 ER once nightly. 7. Discussed risks of addiction, dependency, and opioid induced hyperalgesia. 8. Return to clinic in 3 months or sooner if needed      Prescription monitoring program reviewed. The patient  should keep track of total Tylenol intake and make sure liver function tests have been checked with primary care physician. Pain medications prescribed with the objective of pain relief and improved physical and psychosocial function in this patient. DISPOSITION  · Counseled patient on proper use of prescribed medications. · Counseled patient about chronic medical conditions and their relationship to anxiety and depression and recommended mental health support as needed. · Reviewed with patient self-help tools, home exercise, and lifestyle changes to assist the patient in self-management of symptoms. · Reviewed with patient the treatment plan, goals of treatment plan, and limitations of treatment plan, to include the potential for side effects from medications and procedures. If side effects occur, it is the responsibility of the patient to inform the clinic so that a change in the treatment plan can be made in a safe manner. The patient is advised that stopping prescribed medication may cause an increase in symptoms and possible medication withdrawal symptoms. The patient is informed an emergency room evaluation may be necessary if this occurs. Spent 25 minutes with patient today reviewing the treatment plan, goals of treatment plan, and limitations of the treatment plan, to include the potential for side effects from medications and procedures. More than 50% of the visit time was spent counseling the patient. Bill Wolfe PA-C 1/18/2018        Note: Please excuse any typographical errors. Voice recognition software was used for this note and may cause mistakes.

## 2018-04-12 ENCOUNTER — HOSPITAL ENCOUNTER (OUTPATIENT)
Dept: GENERAL RADIOLOGY | Age: 68
Discharge: HOME | End: 2018-04-12
Attending: FAMILY MEDICINE
Payer: MEDICARE

## 2018-04-12 DIAGNOSIS — M81.0 OSTEOPOROSIS: ICD-10-CM

## 2018-04-12 PROCEDURE — 77080 DXA BONE DENSITY AXIAL: CPT

## 2018-04-19 ENCOUNTER — OFFICE VISIT (OUTPATIENT)
Dept: PAIN MANAGEMENT | Age: 68
End: 2018-04-19

## 2018-04-19 VITALS
TEMPERATURE: 98 F | HEIGHT: 67 IN | WEIGHT: 132 LBS | HEART RATE: 78 BPM | SYSTOLIC BLOOD PRESSURE: 139 MMHG | RESPIRATION RATE: 18 BRPM | DIASTOLIC BLOOD PRESSURE: 87 MMHG | BODY MASS INDEX: 20.72 KG/M2

## 2018-04-19 DIAGNOSIS — M54.50 CHRONIC BILATERAL LOW BACK PAIN WITHOUT SCIATICA: ICD-10-CM

## 2018-04-19 DIAGNOSIS — Z79.899 ENCOUNTER FOR LONG-TERM (CURRENT) USE OF HIGH-RISK MEDICATION: ICD-10-CM

## 2018-04-19 DIAGNOSIS — G89.4 CHRONIC PAIN SYNDROME: Primary | ICD-10-CM

## 2018-04-19 DIAGNOSIS — R07.81 RIB PAIN: ICD-10-CM

## 2018-04-19 DIAGNOSIS — G89.29 CHRONIC BILATERAL LOW BACK PAIN WITHOUT SCIATICA: ICD-10-CM

## 2018-04-19 LAB
ALCOHOL UR POC: NORMAL
AMPHETAMINES UR POC: NEGATIVE
BARBITURATES UR POC: NORMAL
BENZODIAZEPINES UR POC: NORMAL
BUPRENORPHINE UR POC: NEGATIVE
CANNABINOIDS UR POC: NEGATIVE
CARISOPRODOL UR POC: NORMAL
COCAINE UR POC: NEGATIVE
FENTANYL UR POC: NORMAL
MDMA/ECSTASY UR POC: NORMAL
METHADONE UR POC: NEGATIVE
METHAMPHETAMINE UR POC: NORMAL
METHYLPHENIDATE UR POC: NORMAL
OPIATES UR POC: NORMAL
OXYCODONE UR POC: NORMAL
PHENCYCLIDINE UR POC: NORMAL
PROPOXYPHENE UR POC: NORMAL
TRAMADOL UR POC: NORMAL
TRICYCLICS UR POC: NORMAL

## 2018-04-19 RX ORDER — HYDROCODONE BITARTRATE AND ACETAMINOPHEN 10; 325 MG/1; MG/1
1 TABLET ORAL DAILY
Qty: 30 TAB | Refills: 0 | Status: SHIPPED | OUTPATIENT
Start: 2018-04-22 | End: 2018-05-23 | Stop reason: SDUPTHER

## 2018-04-19 RX ORDER — FENTANYL 25 UG/1
1 PATCH TRANSDERMAL
Qty: 10 PATCH | Refills: 0 | Status: SHIPPED | OUTPATIENT
Start: 2018-04-19 | End: 2018-05-23 | Stop reason: SDUPTHER

## 2018-04-19 NOTE — PROGRESS NOTES
HISTORY OF PRESENT ILLNESS  Aramis Gutierrez is a 74 y.o. female    Ms. Gutierrez returns today for f/u of chronic bilateral rib pain caused by a fall several years ago with fractures to 2 of her ribs. No h/o of surgery. She has history of PT previously with no improvement.       She continues unchanged with her chronic pain since last visit.  She does regularly see her primary care provider. Next visit is in July. She also continues to see her therapist on a regular basis. We discussed her current condition and medications in detail today.  She tolerates medications without side effects. Maxine Gutierrez reports no change in sleep.  Pt does report constipation but is well controlled with over the counter medication. Patient understands current practice transition and taper plan.  Patient is planning on transferring her continued pain management care to another practice. She will sign a medical release form today. I will provide 1 month transition prescription. We will assist patient with any medical records transfer as needed. Current MME dose as of today:190    She is currently using Fentanyl 50 mcg every 48hrs,  Norco 10/325mg every day PRN, Ibuprofen 800mg TID, Valium 5 mg every day, and Ambien 12.5 Qhs by another provider Medications are helping with pain control and quality of life. Her pain is 1/10 with medication and 7-8/10 without.  Pt describes pain as aching and stabbing. The patient reports 70% relief with current medications.  Aggravating factors are not identified. Relieved with rest, medication,  and avoiding painful activities. Current treatment is helping to improve general activity, mood, walking, sleep, enjoyment of life      Pain Meds and Quality Of Life have been reviewed. Nonpharmacologic therapy and non-opioid pharmacologic therapy were considered. If opioid therapy is prescribed, this is only if the expected benefits are anticipated to outweigh risks.      She  is otherwise doing well with no other complaints today. She denies any adverse events including nausea, vomiting, dizziness, increased constipation, hallucinations, or seizures. The patient reports functional improvement and QOL with pain medication. Vitals:    18 1238   BP: 139/87   Pulse: 78   Resp: 18   Temp: 98 °F (36.7 °C)   TempSrc: Oral   Weight: 59.9 kg (132 lb)   Height: 5' 7\" (1.702 m)   PainSc:   1   PainLoc: Flank         Allergies   Allergen Reactions    Ceftin [Cefuroxime Axetil] Itching    Lamotrigine Other (comments)     Increased Tremors    Norvasc [Amlodipine] Not Reported This Time    Pcn [Penicillins] Hives    Procardia [Nifedipine] Other (comments)     Headache, Palpitations, and kidney pain    Sulfa (Sulfonamide Antibiotics) Other (comments)     GI problems    Trazodone Palpitations       Past Surgical History:   Procedure Laterality Date    HX APPENDECTOMY      HX CHOLECYSTECTOMY  2017    HX GYN      Ovarian cystectomy    HX GYN      Stillbirths, miscarriage,     HX OOPHORECTOMY      HX THORACOTOMY      HX TONSILLECTOMY      HX TUBAL LIGATION         ROS   Constitutional: Negative for chills. HENT: Negative for congestion, ear pain and sore throat (occasioinal).    Eyes: Negative.    Respiratory: Positive for wheezing (occasional).    Gastrointestinal: Negative for constipation and heartburn. Musculoskeletal: Negative for falls, joint pain, myalgias and neck pain. Skin: Negative for itching and rash. Neurological: Negative for speech change and seizures. Endo/Heme/Allergies: Negative for environmental allergies. Psychiatric/Behavioral: Positive for depression. Negative for suicidal ideas. The patient is nervous/anxious. The patient does not have insomnia.         Physical Exam   Constitutional: She is oriented to person, place, and time. She appears well-developed and well-nourished. HENT:   Head: Normocephalic. Eyes: EOM are normal.   Neck: Normal range of motion. Pulmonary/Chest: Effort normal. No respiratory distress. Musculoskeletal: She exhibits tenderness. She exhibits no edema. Neurological: She is alert and oriented to person, place, and time. Psychiatric: She has a normal mood and affect. Her behavior is normal. Judgment and thought content normal.   Nursing note and vitals reviewed.     ASSESSMENT:    1. Chronic pain syndrome    2. Encounter for long-term (current) use of high-risk medication    3. Chronic bilateral low back pain without sciatica    4. Rib pain          COMM:    Not found in record at this time    [place] was reviewed which does not demonstrate aberrancies and/or inconsistencies with regard to the historical prescribing of controlled medications to this patient by other providers. Medications were brought to visit today. Pill count was appropriate. When possible, non-drug therapy for chronic pain should be used as a first-line treatment. Physical therapy exercise regimens, chiropractic manipulation, meditation relaxation techniques, cognitive behavior therapy, acupuncture, yoga, Juan J Chi,  transcutaneous electrical nerve stimulation (TENS), and application of moist heat can help alleviate pain . Explained that realistic expectations and goals with chronic pain management are to maximize function and minimize pain with the understanding that limitations will exist both in the extent of relief that she may achieve, as well as thresholds of mg strengths that we will not exceed. Our role is to help the patient better cope with chronic pain utilizng a multimodal approach. The patients condition and plan were discussed. All questions were answered. The patient agrees with the plan. PLAN / Pt Instructions:  1. Modify current plan with no evidence of addiction or diversion. 2. Stable on current medication without adverse events.    3.  Refill  and adjust dose down to fentanyl  25 mcg patch every 72 hours. 4. Refill hydrocodone/acetaminophen 10/325 mg once daily as needed for pain. 5. Continue Valium 5 mg once daily as needed by PCP/therapist  6. Continue Ambien 12.5 ER once nightly. By PCP/therapist  7. Patient understands benzodiazepines and opioid will not be provided by this clinic together in future  8. Discussed risks of addiction, dependency, and opioid induced hyperalgesia. 9. Patient plans to find another pain management office. She will tentatively schedule for a 3 month appointment. We will assist patient with medical records transfer as needed. Prescription monitoring program reviewed. The patient  should keep track of total Tylenol intake and make sure liver function tests have been checked with primary care physician. POC UDS today. Pain medications prescribed with the objective of pain relief and improved physical and psychosocial function in this patient. DISPOSITION  · Counseled patient on proper use of prescribed medications. · Counseled patient about chronic medical conditions and their relationship to anxiety and depression and recommended mental health support as needed. · Reviewed with patient self-help tools, home exercise, and lifestyle changes to assist the patient in self-management of symptoms. · Reviewed with patient the treatment plan, goals of treatment plan, and limitations of treatment plan, to include the potential for side effects from medications and procedures. If side effects occur, it is the responsibility of the patient to inform the clinic so that a change in the treatment plan can be made in a safe manner. The patient is advised that stopping prescribed medication may cause an increase in symptoms and possible medication withdrawal symptoms. The patient is informed an emergency room evaluation may be necessary if this occurs.         Spent 25 minutes with patient today reviewing the treatment plan, goals of treatment plan, and limitations of the treatment plan, to include the potential for side effects from medications and procedures. More than 50% of the visit time was spent counseling the patient. Kristie Foreman PA-C 4/19/2018        Note: Please excuse any typographical errors. Voice recognition software was used for this note and may cause mistakes.

## 2018-04-19 NOTE — PROGRESS NOTES
Nursing Notes    Patient presents to the office today in follow-up. Patient rates her pain at 1/10 on the numerical pain scale. Reviewed medications with counts as follows:    Rx Date filled Qty Dispensed Pill Count Last Dose Short   Fentanyl 50 mcg 03/23/18 15 4+1 on today no   Norco  mg 03/23/18 30 8 This a.m no                                POC UDS was performed in office today    Any new labs or imaging since last appointment? NO    Have you been to an emergency room (ER) or urgent care clinic since your last visit? NO            Have you been hospitalized since your last visit? NO     If yes, where, when, and reason for visit? Have you seen or consulted any other health care providers outside of the [address] [place] [address]  since your last visit? NO     If yes, where, when, and reason for visit? HM deferred to pcp.

## 2018-04-19 NOTE — MR AVS SNAPSHOT
[address] [address] 
[phone] Patient: Aida Heck Case MRN: [id number] Visit Information Date & Time Provider Department Dept. Phone Encounter #  
 4/19/2018 11:20 AM Doug Torres [address] [place] [address] for Pain Management  Upcoming Health Maintenance Date Due Hepatitis C Screening 1950 DTaP/Tdap/Td series (1 - Tdap) 8/9/1973 BREAST CANCER SCRN MAMMOGRAM 8/9/2002 FOBT Q 1 YEAR AGE 50-75 8/9/2002 ZOSTER VACCINE AGE 60> 6/9/2012 Influenza Age 5 to Adult 8/1/2017 GLAUCOMA SCREENING Q2Y 8/9/2017 Pneumococcal 65+ Low/Medium Risk (1 of 2 - PCV13) 8/9/2017 MEDICARE YEARLY EXAM 3/14/2018 Allergies as of 4/19/2018  Review Complete On: 4/19/2018 By: Jeannine Self PA-C Severity Noted Reaction Type Reactions Ceftin [Cefuroxime Axetil]    Itching Lamotrigine  01/18/2018    Other (comments) Increased Tremors Norvasc [Amlodipine]    Not Reported This Time  
 Pcn [Penicillins]    Hives Procardia [Nifedipine]  01/18/2018    Other (comments) Headache, Palpitations, and kidney pain  
 Sulfa (Sulfonamide Antibiotics)    Other (comments) GI problems Trazodone  01/18/2018    Palpitations Current Immunizations  Never Reviewed No immunizations on file. Not reviewed this visit You Were Diagnosed With   
  
 Codes Comments Chronic pain syndrome    -  Primary ICD-10-CM: G89.4 ICD-9-CM: 338.4 Encounter for long-term (current) use of high-risk medication     ICD-10-CM: Z79.899 ICD-9-CM: V58.69 Chronic bilateral low back pain without sciatica     ICD-10-CM: M54.5, G89.29 ICD-9-CM: 724.2, 338.29 Rib pain     ICD-10-CM: R07.81 ICD-9-CM: 786.50 Vitals BP Pulse Temp Resp Height(growth percentile) Weight(growth percentile)  139/87 (BP 1 Location: Right arm, BP Patient Position: Sitting) 78 98 °F (36.7 °C) (Oral) 18 5' 7\" (1.702 m) 132 lb (59.9 kg) BMI OB Status Smoking Status 20.67 kg/m2 Postmenopausal Current Every Day Smoker BMI and BSA Data Body Mass Index Body Surface Area  
 20.67 kg/m 2 1.68 m 2 Preferred Pharmacy Pharmacy Name Phone [facility] [address], [address] [place] [address] [phone] Your Updated Medication List  
  
   
This list is accurate as of 4/19/18  1:26 PM.  Always use your most recent med list.  
  
  
  
  
 AMBIEN 10 mg tablet Generic drug:  zolpidem Take  by mouth nightly as needed. benzonatate 200 mg capsule Commonly known as:  TESSALON Take 200 mg by mouth three (3) times daily as needed. BONIVA PO Take  by mouth. COZAAR 25 mg tablet Generic drug:  losartan Take  by mouth daily. diazePAM 5 mg tablet Commonly known as:  VALIUM Take one tab po qd prn muscle spasms/sleep. Indications: MUSCLE SPASM * fentaNYL 50 mcg/hr PATCH Commonly known as:  DURAGESIC  
1 Patch by TransDERmal route every fourty-eight (48) hours. * fentaNYL 25 mcg/hr PATCH Commonly known as:  DURAGESIC  
1 Patch by TransDERmal route every seventy-two (72) hours. Max Daily Amount: 1 Patch. Mylan only  
  
 guaiFENesin 100 mg/5 mL liquid Commonly known as:  ROBITUSSIN Take 200 mg by mouth three (3) times daily as needed for Cough. hydroCHLOROthiazide 12.5 mg tablet Commonly known as:  HYDRODIURIL Take 12.5 mg by mouth daily. * HYDROcodone-acetaminophen  mg per tablet Commonly known as:  Levonia Filler Take 1 Tab by mouth three (3) times daily. Prn activity pain (due to fill 3/2/12, 4/1/12, 4/30/12) * HYDROcodone-acetaminophen  mg tablet Commonly known as:  Edda Espinosa Take 1 Tab by mouth daily for 30 days. For breakthrough pain. Indications: Pain Start taking on:  4/22/2018  
  
 metoprolol succinate 25 mg XL tablet Commonly known as:  TOPROL-XL  
 Take  by mouth daily. MOTRIN  mg tablet Generic drug:  ibuprofen Take 800 mg by mouth.  
  
 naloxone 4 mg/actuation nasal spray Commonly known as:  NARCAN  
4 mg by Nasal route as needed for up to 2 doses. Indications: OPIOID TOXICITY TYLENOL PO Take  by mouth. VENTOLIN HFA 90 mcg/actuation inhaler Generic drug:  albuterol Take 1 Puff by inhalation. VITAMIN D2 PO Take  by mouth. XYZAL 5 mg tablet Generic drug:  levocetirizine Take  by mouth daily. * Notice: This list has 4 medication(s) that are the same as other medications prescribed for you. Read the directions carefully, and ask your doctor or other care provider to review them with you. Prescriptions Printed Refills HYDROcodone-acetaminophen (NORCO)  mg tablet 0 Starting on: 2018 Sig: Take 1 Tab by mouth daily for 30 days. For breakthrough pain. Indications: Pain Class: Print Route: Oral  
 fentaNYL (DURAGESIC) 25 mcg/hr PATCH 0 Si Patch by TransDERmal route every seventy-two (72) hours. Max Daily Amount: 1 Patch. Mylan only Class: Print Route: TransDERmal  
  
We Performed the Following AMB POC DRUG SCREEN () [ [facility]] DRUG SCREEN [[id number] Custom] Introducing [facility]! [employer] introduces [facility] [employer] patient portal. Now you can access parts of your medical record, email your doctor's office, and request medication refills online. 1. In your internet browser, go to [email or web address] 2. Click on the First Time User? Click Here link in the Sign In box. You will see the New Member Sign Up page. 3. Enter your [facility] [employer] Access Code exactly as it appears below. You will not need to use this code after youve completed the sign-up process. If you do not sign up before the expiration date, you must request a new code. · [facility] [employer] Access Code: [email or web address] Expires: 2018 12:16 PM 
 
 4. Enter the last four digits of your Social Security Number (xxxx) and Date of Birth (mm/dd/yyyy) as indicated and click Submit. You will be taken to the next sign-up page. 5. Create a [employer] ID. This will be your [employer] login ID and cannot be changed, so think of one that is secure and easy to remember. 6. Create a [employer] password. You can change your password at any time. 7. Enter your Password Reset Question and Answer. This can be used at a later time if you forget your password. 8. Enter your e-mail address. You will receive e-mail notification when new information is available in [address] 9. Click Sign Up. You can now view and download portions of your medical record. 10. Click the Download Summary menu link to download a portable copy of your medical information. If you have questions, please visit the Frequently Asked Questions section of the [employer] website. Remember, [employer] is NOT to be used for urgent needs. For medical emergencies, dial 911. Now available from your iPhone and Android! Please provide this summary of care documentation to your next provider. Your primary care clinician is listed as NONE. If you have any questions after today's visit, please call [phone].

## 2018-05-23 ENCOUNTER — TELEPHONE (OUTPATIENT)
Dept: PAIN MANAGEMENT | Age: 68
End: 2018-05-23

## 2018-05-23 DIAGNOSIS — G89.29 CHRONIC BILATERAL LOW BACK PAIN WITHOUT SCIATICA: ICD-10-CM

## 2018-05-23 DIAGNOSIS — G89.4 CHRONIC PAIN SYNDROME: ICD-10-CM

## 2018-05-23 DIAGNOSIS — R07.81 RIB PAIN: ICD-10-CM

## 2018-05-23 DIAGNOSIS — M54.50 CHRONIC BILATERAL LOW BACK PAIN WITHOUT SCIATICA: ICD-10-CM

## 2018-05-23 RX ORDER — FENTANYL 25 UG/1
1 PATCH TRANSDERMAL
Qty: 10 PATCH | Refills: 0 | Status: SHIPPED | OUTPATIENT
Start: 2018-05-27 | End: 2018-08-07 | Stop reason: SDUPTHER

## 2018-05-23 RX ORDER — HYDROCODONE BITARTRATE AND ACETAMINOPHEN 10; 325 MG/1; MG/1
1 TABLET ORAL DAILY
Qty: 30 TAB | Refills: 0 | Status: SHIPPED | OUTPATIENT
Start: 2018-05-27 | End: 2018-06-26

## 2018-05-23 NOTE — TELEPHONE ENCOUNTER
Called patient to notify them that their prescriptions are ready for . Patient verbalized understanding and stated she will try to come in the next few days to pick them up.

## 2018-05-23 NOTE — TELEPHONE ENCOUNTER
Rachael Meyers Case has called requesting a refill of their controlled medication, norco 10/325mg and fentanyl 25mcg, for the management of chronic generalized pain . Last office visit date: 04/19/18    Date last  was pulled and reviewed : 05/23/18    Was the patient compliant when the above report was pulled? no    Analgesia: patient expresses 80% pain relief    Aberrancies: patient continues with concurrent ambien/benzodiapine  therapy    ADL's: patient expresses ability to execute adl care with medications    Adverse Reaction: none noted    Provider's last note and plan of care reviewed? Yes      Request forwarded to provider for review.

## 2018-06-05 ENCOUNTER — HOSPITAL ENCOUNTER (OUTPATIENT)
Dept: NON INVASIVE DIAGNOSTICS | Age: 68
Discharge: HOME | End: 2018-06-05
Attending: FAMILY MEDICINE
Payer: MEDICARE

## 2018-06-05 DIAGNOSIS — R94.31 NONSPECIFIC ABNORMAL ELECTROCARDIOGRAM (ECG) (EKG): ICD-10-CM

## 2018-06-05 PROCEDURE — 93306 TTE W/DOPPLER COMPLETE: CPT

## 2018-07-09 ENCOUNTER — TELEPHONE (OUTPATIENT)
Dept: PAIN MANAGEMENT | Age: 68
End: 2018-07-09

## 2018-08-07 ENCOUNTER — OFFICE VISIT (OUTPATIENT)
Dept: PAIN MANAGEMENT | Age: 68
End: 2018-08-07

## 2018-08-07 VITALS
RESPIRATION RATE: 15 BRPM | HEIGHT: 67 IN | TEMPERATURE: 97.6 F | DIASTOLIC BLOOD PRESSURE: 92 MMHG | BODY MASS INDEX: 22.76 KG/M2 | SYSTOLIC BLOOD PRESSURE: 195 MMHG | HEART RATE: 58 BPM | WEIGHT: 145 LBS

## 2018-08-07 DIAGNOSIS — G89.29 CHRONIC BILATERAL LOW BACK PAIN WITHOUT SCIATICA: ICD-10-CM

## 2018-08-07 DIAGNOSIS — M54.50 CHRONIC BILATERAL LOW BACK PAIN WITHOUT SCIATICA: ICD-10-CM

## 2018-08-07 DIAGNOSIS — R07.81 RIB PAIN: ICD-10-CM

## 2018-08-07 DIAGNOSIS — Z79.899 ENCOUNTER FOR LONG-TERM (CURRENT) USE OF HIGH-RISK MEDICATION: ICD-10-CM

## 2018-08-07 DIAGNOSIS — G58.0 INTRACTABLE INTERCOSTAL NEUROPATHIC PAIN: Primary | ICD-10-CM

## 2018-08-07 DIAGNOSIS — G89.4 CHRONIC PAIN SYNDROME: ICD-10-CM

## 2018-08-07 LAB
ALCOHOL UR POC: NORMAL
AMPHETAMINES UR POC: NEGATIVE
BARBITURATES UR POC: NORMAL
BENZODIAZEPINES UR POC: NORMAL
BUPRENORPHINE UR POC: NEGATIVE
CANNABINOIDS UR POC: NEGATIVE
CARISOPRODOL UR POC: NORMAL
COCAINE UR POC: NEGATIVE
FENTANYL UR POC: NORMAL
MDMA/ECSTASY UR POC: NORMAL
METHADONE UR POC: NEGATIVE
METHAMPHETAMINE UR POC: NORMAL
METHYLPHENIDATE UR POC: NORMAL
OPIATES UR POC: NORMAL
OXYCODONE UR POC: NEGATIVE
PHENCYCLIDINE UR POC: NORMAL
PROPOXYPHENE UR POC: NORMAL
TRAMADOL UR POC: NORMAL
TRICYCLICS UR POC: NORMAL

## 2018-08-07 RX ORDER — HYDROCODONE BITARTRATE AND ACETAMINOPHEN 10; 325 MG/1; MG/1
1 TABLET ORAL
Qty: 30 TAB | Refills: 0 | Status: SHIPPED | OUTPATIENT
Start: 2018-08-09 | End: 2018-09-08

## 2018-08-07 RX ORDER — FENTANYL 12.5 UG/1
1 PATCH TRANSDERMAL
Qty: 10 PATCH | Refills: 0 | Status: SHIPPED | OUTPATIENT
Start: 2018-09-08 | End: 2018-10-03 | Stop reason: ALTCHOICE

## 2018-08-07 RX ORDER — FENTANYL 25 UG/1
1 PATCH TRANSDERMAL
Qty: 10 PATCH | Refills: 0 | Status: SHIPPED | OUTPATIENT
Start: 2018-08-09 | End: 2018-09-08

## 2018-08-07 RX ORDER — HYDROCODONE BITARTRATE AND ACETAMINOPHEN 5; 325 MG/1; MG/1
1 TABLET ORAL
Qty: 100 TAB | Refills: 0 | Status: SHIPPED | OUTPATIENT
Start: 2018-09-09 | End: 2018-10-03 | Stop reason: ALTCHOICE

## 2018-08-07 NOTE — PROGRESS NOTES
Nursing Notes    Patient presents to the office today in follow-up. Patient rates her pain at 2/10 on the numerical pain scale. Reviewed medications with counts as follows:    Rx Date filled Qty Dispensed Pill Count Last Dose Short   Fentanyl 25 mcg/hr  07/18/18 7 0+1 on  Last patch on right breast no   norco 10/325 mg 05/30/18 30 91/2 today no                           POC UDS was performed in office today. Any new labs or imaging since last appointment? NO    Have you been to an emergency room (ER) or urgent care clinic since your last visit? NO            Have you been hospitalized since your last visit? NO     If yes, where, when, and reason for visit? Have you seen or consulted any other health care providers outside of the [address] [place] [address]  since your last visit? YES     If yes, where, when, and reason for visit? Pt went to PCP. Could not get in here for an appt and was given a prescription by her pcp to get her to her visit today. Ms. Gutierrez has a reminder for a \"due or due soon\" health maintenance. I have asked that she contact her primary care provider for follow-up on this health maintenance.     PHQ over the last two weeks 8/7/2018   PHQ Not Done -   Little interest or pleasure in doing things Nearly every day   Feeling down, depressed, irritable, or hopeless Nearly every day   Total Score PHQ 2 6     Provider made aware of the above score

## 2018-08-07 NOTE — PATIENT INSTRUCTIONS
Learning About Benefits From Quitting Smoking  How does quitting smoking make you healthier? If you're thinking about quitting smoking, you may have a few reasons to be smoke-free. Your health may be one of them. · When you quit smoking, you lower your risks for cancer, lung disease, heart attack, stroke, blood vessel disease, and blindness from macular degeneration. · When you're smoke-free, you get sick less often, and you heal faster. You are less likely to get colds, flu, bronchitis, and pneumonia. · As a nonsmoker, you may find that your mood is better and you are less stressed. When and how will you feel healthier? Quitting has real health benefits that start from day 1 of being smoke-free. And the longer you stay smoke-free, the healthier you get and the better you feel. The first hours  · After just 20 minutes, your blood pressure and heart rate go down. That means there's less stress on your heart and blood vessels. · Within 12 hours, the level of carbon monoxide in your blood drops back to normal. That makes room for more oxygen. With more oxygen in your body, you may notice that you have more energy than when you smoked. After 2 weeks  · Your lungs start to work better. · Your risk of heart attack starts to drop. After 1 month  · When your lungs are clear, you cough less and breathe deeper, so it's easier to be active. · Your sense of taste and smell return. That means you can enjoy food more than you have since you started smoking. Over the years  · After 1 year, your risk of heart disease is half what it would be if you kept smoking. · After 5 years, your risk of stroke starts to shrink. Within a few years after that, it's about the same as if you'd never smoked. · After 10 years, your risk of dying from lung cancer is cut by about half. And your risk for many other types of cancer is lower too. How would quitting help others in your life?   When you quit smoking, you improve the health of everyone who now breathes in your smoke. · Their heart, lung, and cancer risks drop, much like yours. · They are sick less. For babies and small children, living smoke-free means they're less likely to have ear infections, pneumonia, and bronchitis. · If you're a woman who is or will be pregnant someday, quitting smoking means a healthier . · Children who are close to you are less likely to become adult smokers. Where can you learn more? Go to http://paige-mignon.info/. Enter [id number] [id number] [id number] in the search box to learn more about \"Learning About Benefits From Quitting Smoking. \"  Current as of: 2017  Content Version: 11.7  © 4807-1056 [employer]. Care instructions adapted under license by [employer] (which disclaims liability or warranty for this information). If you have questions about a medical condition or this instruction, always ask your healthcare professional. [place] [address] any warranty or liability for your use of this information. Acute High Blood Pressure: Care Instructions  Your Care Instructions    Acute high blood pressure is very high blood pressure. It's a serious problem. Very high blood pressure can damage your brain, heart, eyes, and kidneys. You may have been given medicines to lower your blood pressure. You may have gotten them as pills or through a needle in one of your veins. This is called an IV. And maybe you were given other medicines too. These can be needed when high blood pressure causes other problems. To keep your blood pressure at a lower level, you may need to make healthy lifestyle changes. And you will probably need to take medicines. Be sure to follow up with your doctor about your blood pressure and what you can do about it. Follow-up care is a key part of your treatment and safety. Be sure to make and go to all appointments, and call your doctor if you are having problems.  It's also a good idea to know your test results and keep a list of the medicines you take. How can you care for yourself at home? · See your doctor as often as he or she recommends. This is to make sure your blood pressure is under control. You will probably go at least 2 times a year. But it may be more often at first.  · Take your blood pressure medicine exactly as prescribed. You may take one or more types. They include diuretics, beta-blockers, ACE inhibitors, calcium channel blockers, and angiotensin II receptor blockers. Call your doctor if you think you are having a problem with your medicine. · If you take blood pressure medicine, talk to your doctor before you take decongestants or anti-inflammatory medicine, such as ibuprofen. These can raise blood pressure. · Learn how to check your blood pressure at home. Check it often. · Ask your doctor if it's okay to drink alcohol. · Talk to your doctor about lifestyle changes that can help blood pressure. These include being active and not smoking. When should you call for help? Call 911 anytime you think you may need emergency care. This may mean having symptoms that suggest that your blood pressure is causing a serious heart or blood vessel problem. Your blood pressure may be over 180/110.   For example, call 911 if:    · You have symptoms of a heart attack. These may include:  ¨ Chest pain or pressure, or a strange feeling in the chest.  ¨ Sweating. ¨ Shortness of breath. ¨ Nausea or vomiting. ¨ Pain, pressure, or a strange feeling in the back, neck, jaw, or upper belly or in one or both shoulders or arms. ¨ Lightheadedness or sudden weakness. ¨ A fast or irregular heartbeat.     · You have symptoms of a stroke. These may include:  ¨ Sudden numbness, tingling, weakness, or loss of movement in your face, arm, or leg, especially on only one side of your body. ¨ Sudden vision changes. ¨ Sudden trouble speaking.   ¨ Sudden confusion or trouble understanding simple statements. ¨ Sudden problems with walking or balance. ¨ A sudden, severe headache that is different from past headaches.     · You have severe back or belly pain.    Do not wait until your blood pressure comes down on its own. Get help right away.   Call your doctor now or seek immediate care if:    · Your blood pressure is much higher than normal (such as 180/110 or higher), but you don't have symptoms.     · You think high blood pressure is causing symptoms, such as:  ¨ Severe headache. ¨ Blurry vision.    Watch closely for changes in your health, and be sure to contact your doctor if:    · Your blood pressure measures 140/90 or higher at least 2 times. That means the top number is 140 or higher or the bottom number is 90 or higher, or both.     · You think you may be having side effects from your blood pressure medicine.     · Your blood pressure is usually normal, but it goes above normal at least 2 times. Where can you learn more? Go to http://paige-mignon.info/. Enter V176 in the search box to learn more about \"Acute High Blood Pressure: Care Instructions. \"  Current as of: May 10, 2017  Content Version: 11.7  © 9666-6064 [employer]. Care instructions adapted under license by [employer] (which disclaims liability or warranty for this information). If you have questions about a medical condition or this instruction, always ask your healthcare professional. [address] any warranty or liability for your use of this information. Learning About Sleeping Well  What does sleeping well mean? Sleeping well means getting enough sleep. How much sleep is enough varies among people. The number of hours you sleep is not as important as how you feel when you wake up. If you do not feel refreshed, you probably need more sleep. Another sign of not getting enough sleep is feeling tired during the day.   The average total nightly sleep time is 7½ to 8 hours. Healthy adults may need a little more or a little less than this. Why is getting enough sleep important? Getting enough quality sleep is a basic part of good health. When your sleep suffers, your mood and your thoughts can suffer too. You may find yourself feeling more grumpy or stressed. Not getting enough sleep also can lead to serious problems, including injury, accidents, anxiety, and depression. What might cause poor sleeping? Many things can cause sleep problems, including:  · Stress. Stress can be caused by fear about a single event, such as giving a speech. Or you may have ongoing stress, such as worry about work or school. · Depression, anxiety, and other mental or emotional conditions. · Changes in your sleep habits or surroundings. This includes changes that happen where you sleep, such as noise, light, or sleeping in a different bed. It also includes changes in your sleep pattern, such as having jet lag or working a late shift. · Health problems, such as pain, breathing problems, and restless legs syndrome. · Lack of regular exercise. How can you help yourself? Here are some tips that may help you sleep more soundly and wake up feeling more refreshed. Your sleeping area  · Use your bedroom only for sleeping and sex. A bit of light reading may help you fall asleep. But if it doesn't, do your reading elsewhere in the house. Don't watch TV in bed. · Be sure your bed is big enough to stretch out comfortably, especially if you have a sleep partner. · Keep your bedroom quiet, dark, and cool. Use curtains, blinds, or a sleep mask to block out light. To block out noise, use earplugs, soothing music, or a \"white noise\" machine. Your evening and bedtime routine  · Create a relaxing bedtime routine. You might want to take a warm shower or bath, listen to soothing music, or drink a cup of noncaffeinated tea. · Go to bed at the same time every night.  And get up at the same time every morning, even if you feel tired. What to avoid  · Limit caffeine (coffee, tea, caffeinated sodas) during the day, and don't have any for at least 4 to 6 hours before bedtime. · Don't drink alcohol before bedtime. Alcohol can cause you to wake up more often during the night. · Don't smoke or use tobacco, especially in the evening. Nicotine can keep you awake. · Don't take naps during the day, especially close to bedtime. · Don't lie in bed awake for too long. If you can't fall asleep, or if you wake up in the middle of the night and can't get back to sleep within 15 minutes or so, get out of bed and go to another room until you feel sleepy. · Don't take medicine right before bed that may keep you awake or make you feel hyper or energized. Your doctor can tell you if your medicine may do this and if you can take it earlier in the day. If you can't sleep  · Imagine yourself in a peaceful, pleasant scene. Focus on the details and feelings of being in a place that is relaxing. · Get up and do a quiet or boring activity until you feel sleepy. · Don't drink any liquids after 6 p.m. if you wake up often because you have to go to the bathroom. Where can you learn more? Go to http://paige-mignon.info/. Enter B673 in the search box to learn more about \"Learning About Sleeping Well. \"  Current as of: December 7, 2017  Content Version: 11.7  © 2299-0317 [employer]. Care instructions adapted under license by [employer] (which disclaims liability or warranty for this information). If you have questions about a medical condition or this instruction, always ask your healthcare professional. [place] [address] any warranty or liability for your use of this information. Safe Use of Opioid Pain Medicine: Care Instructions  Your Care Instructions  Pain is your body's way of warning you that something is wrong. Pain feels different for everybody.  Only you can describe your pain. A doctor can suggest or prescribe many types of medicines for pain. These range from over-the-counter medicines like acetaminophen (Tylenol) to powerful medicines called opioids. Examples of opioids are fentanyl, hydrocodone, morphine, and oxycodone. Heroin is an illegal opioid  Opioids are strong medicines. They can help you manage pain when you use them the right way. But if you misuse them, they can cause serious harm and even death. For these reasons, doctors are very careful about how they prescribe opioids. If you decide to take opioids, here are some things to remember. · Keep your doctor informed. You can get addicted to opioids. The risk is higher if you have a history of substance use. Your doctor will monitor you closely for signs of misuse and addiction and to figure out when you no longer need to take opioids. · Make a treatment plan. The goal of your plan is to be able to function and do the things you need to do, even if you still have some pain. You might be able to manage your pain with other non-opioid options like physical therapy, relaxation, or over-the-counter pain medicines. · Be aware of the side effects. Opioids can cause serious side effects, such as constipation, dry mouth, and nausea. And over time, you may need a higher dose to get pain relief. This is called tolerance. Your body also gets used to opioids. This is called physical dependence. If you suddenly stop taking them, you may have withdrawal symptoms. The doctor carefully considered what pain medicine is right for you. You may not have received opioids if your doctor was concerned about drug interactions or your safety, or if he or she had other concerns. It is best to have one doctor or clinic treat your pain. This way you will get the pain medicine that will help you the most. And a doctor will be able to watch for any problems that the medicine might cause.   The doctor has checked you carefully, but problems can develop later. If you notice any problems or new symptoms,  get medical treatment right away. Follow-up care is a key part of your treatment and safety. Be sure to make and go to all appointments, and call your doctor if you are having problems. It's also a good idea to know your test results and keep a list of the medicines you take. How can you care for yourself at home? · If you need to take opioids to manage your pain, remember these safety tips. ¨ Follow directions carefully. It's easy to misuse opioids if you take a dose other than what's prescribed by your doctor. This can lead to overdose and even death. Even sharing them with someone they weren't meant for is misuse. ¨ Be cautious. Opioids may affect your judgment and decision making. Do not drive or operate machinery until you can think clearly. Talk with your doctor about when it is safe to drive. ¨ Reduce the risk of drug interactions. Opioids can be dangerous if you take them with alcohol or with certain drugs like sleeping pills and muscle relaxers. Make sure your doctor knows about all the other medicines you take, including over-the-counter medicines. Don't start any new medicines before you talk to your doctor or pharmacist.  Peter Diesel Keep others safe. Store opioids in a safe and secure place. Make sure that pets, children, friends, and family can't get to them. When you're done using opioids, make sure to properly dispose of them. You can either use a community drug take-back program or your drugstore's mail-back program. If one of these programs isn't available, you can flush opioid skin patches and unused opioid pills down the toilet. ¨ Reduce the risk of overdose. Misuse of opioids can be very dangerous. Protect yourself by asking your doctor about a naloxone rescue kit. It can help you-and even save your life-if you take too much of an opioid. · Try other ways to reduce pain. ¨ Relax, and reduce stress.  Relaxation techniques such as deep breathing or meditation can help. ¨ Keep moving. Gentle, daily exercise can help reduce pain over the long run. Try low- or no-impact exercises such as walking, swimming, and stationary biking. Do stretches to stay flexible. ¨ Try heat, cold packs, and massage. ¨ Get enough sleep. Pain can make you tired and drain your energy. Talk with your doctor if you have trouble sleeping because of pain. ¨ Think positive. Your thoughts can affect your pain level. Do things that you enjoy to distract yourself when you have pain instead of focusing on the pain. See a movie, read a book, listen to music, or spend time with a friend. · If you are not taking a prescription pain medicine, ask your doctor if you can take an over-the-counter medicine. When should you call for help? Call your doctor now or seek immediate medical care if:    · You have a new kind of pain.     · You have new symptoms, such as a fever or rash, along with the pain.    Watch closely for changes in your health, and be sure to contact your doctor if:    · You think you might be using too much pain medicine, and you need help to use less or stop.     · Your pain gets worse.     · You would like a referral to a doctor or clinic that specializes in pain management. Where can you learn more? Go to http://paige-mignon.info/. Enter R108 in the search box to learn more about \"Safe Use of Opioid Pain Medicine: Care Instructions. \"  Current as of: September 10, 2017  Content Version: 11.7  © 4927-0020 [employer]. Care instructions adapted under license by [employer] (which disclaims liability or warranty for this information). If you have questions about a medical condition or this instruction, always ask your healthcare professional. [address] any warranty or liability for your use of this information.

## 2018-08-07 NOTE — MR AVS SNAPSHOT
[address] [facility] [address] 
[phone] Patient: Guzman Clifton Case MRN: [id number] Visit Information Date & Time Provider Department Dept. Phone Encounter #  
 8/7/2018  1:00 PM Zacarias Jones, [address] [facility] [phone] [id number] Follow-up Instructions Return in about 2 months (around 10/7/2018). Your Appointments 10/3/2018 10:45 AM  
Follow Up with HAYDEE Carcamo [facility] [address] for [facility] (RAFI SCHEDULING) Appt Note: Return in about 2 months (around 10/7/2018 [address] [place] [id number] [phone] [facility] [id number] Upcoming Health Maintenance Date Due Hepatitis C Screening 1950 DTaP/Tdap/Td series (1 - Tdap) 8/9/1973 BREAST CANCER SCRN MAMMOGRAM 8/9/2002 FOBT Q 1 YEAR AGE 50-75 8/9/2002 ZOSTER VACCINE AGE 60> 6/9/2012 GLAUCOMA SCREENING Q2Y 8/9/2017 Pneumococcal 65+ Low/Medium Risk (1 of 2 - PCV13) 8/9/2017 MEDICARE YEARLY EXAM 3/14/2018 Influenza Age 5 to Adult 8/1/2018 Allergies as of 8/7/2018  Review Complete On: 8/7/2018 By: Zacarias Jones DO Severity Noted Reaction Type Reactions Ceftin [Cefuroxime Axetil]    Itching Lamotrigine  01/18/2018    Other (comments) Increased Tremors Norvasc [Amlodipine]    Not Reported This Time  
 Pcn [Penicillins]    Hives Procardia [Nifedipine]  01/18/2018    Other (comments) Headache, Palpitations, and kidney pain  
 Sulfa (Sulfonamide Antibiotics)    Other (comments) GI problems Trazodone  01/18/2018    Palpitations Current Immunizations  Never Reviewed No immunizations on file. Not reviewed this visit You Were Diagnosed With   
  
 Codes Comments Intractable intercostal neuropathic pain    -  Primary ICD-10-CM: G54.8 ICD-9-CM: 353.8 Encounter for long-term (current) use of high-risk medication     ICD-10-CM: Z79.899 ICD-9-CM: V58.69 Rib pain     ICD-10-CM: R07.81 ICD-9-CM: 786.50 Chronic pain syndrome     ICD-10-CM: G89.4 ICD-9-CM: 338. 4 Chronic bilateral low back pain without sciatica     ICD-10-CM: M54.5, G89.29 ICD-9-CM: 724.2, 338.29 Vitals BP Pulse Temp Resp Height(growth percentile) Weight(growth percentile) (!) 195/92 (BP 1 Location: Left arm, BP Patient Position: Sitting) (!) 58 97.6 °F (36.4 °C) (Oral) 15 5' 7\" (1.702 m) 145 lb (65.8 kg) BMI OB Status Smoking Status 22.71 kg/m2 Postmenopausal Current Every Day Smoker Vitals History BMI and BSA Data Body Mass Index Body Surface Area  
 22.71 kg/m 2 1.76 m 2 Your Updated Medication List  
  
   
This list is accurate as of 8/7/18  2:26 PM.  Always use your most recent med list.  
  
  
  
  
 AMBIEN 10 mg tablet Generic drug:  zolpidem Take  by mouth nightly as needed. benzonatate 200 mg capsule Commonly known as:  TESSALON Take 200 mg by mouth three (3) times daily as needed. BONIVA PO Take  by mouth.  
  
 capsaicin 8 % Kit topical  
Commonly known as:  [employer] Apply 1 Patch to affected area once for 1 dose. Indications: NEUROPATHIC PAIN  
  
 COZAAR 25 mg tablet Generic drug:  losartan Take  by mouth daily. diazePAM 5 mg tablet Commonly known as:  VALIUM Take one tab po qd prn muscle spasms/sleep. Indications: MUSCLE SPASM * fentaNYL 25 mcg/hr PATCH Commonly known as:  DURAGESIC  
1 Patch by TransDERmal route every seventy-two (72) hours for 30 days. Max Daily Amount: 1 Patch. Mylan only Start taking on:  8/9/2018 * fentaNYL 12 mcg/hr patch Commonly known as:  DURAGESIC  
1 Patch by TransDERmal route every seventy-two (72) hours for 30 days. Max Daily Amount: 1 Patch. Mylan only please Start taking on:  9/8/2018  
  
 guaiFENesin 100 mg/5 mL liquid Commonly known as:  ROBITUSSIN Take 200 mg by mouth three (3) times daily as needed for Cough. hydroCHLOROthiazide 12.5 mg tablet Commonly known as:  HYDRODIURIL Take 12.5 mg by mouth daily. * HYDROcodone-acetaminophen  mg tablet Commonly known as:  Brodie Punt Take 1 Tab by mouth daily as needed for Pain for up to 30 days. Start taking on:  2018  
  
 * HYDROcodone-acetaminophen 5-325 mg per tablet Commonly known as:  Brodie Punt Take 1 Tab by mouth every six (6) hours as needed for Pain (100 tabs to be budgeted over 30 days) for up to 30 days. Max Daily Amount: 4 Tabs. Start taking on:  2018  
  
 metoprolol succinate 25 mg XL tablet Commonly known as:  TOPROL-XL Take 25 mg by mouth daily. MOTRIN  mg tablet Generic drug:  ibuprofen Take 400 mg by mouth every eight (8) hours as needed. naloxone 4 mg/actuation nasal spray Commonly known as:  NARCAN  
4 mg by Nasal route as needed for up to 2 doses. Indications: OPIOID TOXICITY TYLENOL PO Take  by mouth. VENTOLIN HFA 90 mcg/actuation inhaler Generic drug:  albuterol Take 1 Puff by inhalation. VITAMIN D2 PO Take  by mouth. XYZAL 5 mg tablet Generic drug:  levocetirizine Take  by mouth daily. * Notice: This list has 4 medication(s) that are the same as other medications prescribed for you. Read the directions carefully, and ask your doctor or other care provider to review them with you. Prescriptions Printed Refills  
 fentaNYL (DURAGESIC) 25 mcg/hr PATCH 0 Starting on: 2018 Si Patch by TransDERmal route every seventy-two (72) hours for 30 days. Max Daily Amount: 1 Patch. Mylan only Class: Print Route: TransDERmal  
 fentaNYL (DURAGESIC) 12 mcg/hr patch 0 Starting on: 2018 Si Patch by TransDERmal route every seventy-two (72) hours for 30 days. Max Daily Amount: 1 Patch. Mylan only please Class: Print Route: TransDERmal  
 HYDROcodone-acetaminophen (NORCO)  mg tablet 0 Starting on: 8/9/2018 Sig: Take 1 Tab by mouth daily as needed for Pain for up to 30 days. Class: Print Route: Oral  
 HYDROcodone-acetaminophen (NORCO) 5-325 mg per tablet 0 Starting on: 9/9/2018 Sig: Take 1 Tab by mouth every six (6) hours as needed for Pain (100 tabs to be budgeted over 30 days) for up to 30 days. Max Daily Amount: 4 Tabs. Class: Print Route: Oral  
 capsaicin (QUTENZA) 8 % kit topical 0 Sig: Apply 1 Patch to affected area once for 1 dose. Indications: NEUROPATHIC PAIN Class: Print Route: Topical  
  
We Performed the Following AMB POC DRUG SCREEN () [ [facility]] DRUG SCREEN [[id number] Custom] Follow-up Instructions Return in about 2 months (around 10/7/2018). Patient Instructions Learning About Benefits From Quitting Smoking How does quitting smoking make you healthier? If you're thinking about quitting smoking, you may have a few reasons to be smoke-free. Your health may be one of them. · When you quit smoking, you lower your risks for cancer, lung disease, heart attack, stroke, blood vessel disease, and blindness from macular degeneration. · When you're smoke-free, you get sick less often, and you heal faster. You are less likely to get colds, flu, bronchitis, and pneumonia. · As a nonsmoker, you may find that your mood is better and you are less stressed. When and how will you feel healthier? Quitting has real health benefits that start from day 1 of being smoke-free. And the longer you stay smoke-free, the healthier you get and the better you feel. The first hours · After just 20 minutes, your blood pressure and heart rate go down. That means there's less stress on your heart and blood vessels.  
· Within 12 hours, the level of carbon monoxide in your blood drops back to normal. That makes room for more oxygen. With more oxygen in your body, you may notice that you have more energy than when you smoked. After 2 weeks · Your lungs start to work better. · Your risk of heart attack starts to drop. After 1 month · When your lungs are clear, you cough less and breathe deeper, so it's easier to be active. · Your sense of taste and smell return. That means you can enjoy food more than you have since you started smoking. Over the years · After 1 year, your risk of heart disease is half what it would be if you kept smoking. · After 5 years, your risk of stroke starts to shrink. Within a few years after that, it's about the same as if you'd never smoked. · After 10 years, your risk of dying from lung cancer is cut by about half. And your risk for many other types of cancer is lower too. How would quitting help others in your life? When you quit smoking, you improve the health of everyone who now breathes in your smoke. · Their heart, lung, and cancer risks drop, much like yours. · They are sick less. For babies and small children, living smoke-free means they're less likely to have ear infections, pneumonia, and bronchitis. · If you're a woman who is or will be pregnant someday, quitting smoking means a healthier . · Children who are close to you are less likely to become adult smokers. Where can you learn more? Go to http://paige-mignon.info/. Enter [id number] [id number] [id number] in the search box to learn more about \"Learning About Benefits From Quitting Smoking. \" Current as of: 2017 Content Version: 11.7 © 6473-3808 Healthwise, Incorporated. Care instructions adapted under license by BrandBacker (which disclaims liability or warranty for this information).  If you have questions about a medical condition or this instruction, always ask your healthcare professional. [employer] disclaims any warranty or liability for your use of this information. Acute High Blood Pressure: Care Instructions Your Care Instructions Acute high blood pressure is very high blood pressure. It's a serious problem. Very high blood pressure can damage your brain, heart, eyes, and kidneys. You may have been given medicines to lower your blood pressure. You may have gotten them as pills or through a needle in one of your veins. This is called an IV. And maybe you were given other medicines too. These can be needed when high blood pressure causes other problems. To keep your blood pressure at a lower level, you may need to make healthy lifestyle changes. And you will probably need to take medicines. Be sure to follow up with your doctor about your blood pressure and what you can do about it. Follow-up care is a key part of your treatment and safety. Be sure to make and go to all appointments, and call your doctor if you are having problems. It's also a good idea to know your test results and keep a list of the medicines you take. How can you care for yourself at home? · See your doctor as often as he or she recommends. This is to make sure your blood pressure is under control. You will probably go at least 2 times a year. But it may be more often at first. 
· Take your blood pressure medicine exactly as prescribed. You may take one or more types. They include diuretics, beta-blockers, ACE inhibitors, calcium channel blockers, and angiotensin II receptor blockers. Call your doctor if you think you are having a problem with your medicine. · If you take blood pressure medicine, talk to your doctor before you take decongestants or anti-inflammatory medicine, such as ibuprofen. These can raise blood pressure. · Learn how to check your blood pressure at home. Check it often. · Ask your doctor if it's okay to drink alcohol.  
· Talk to your doctor about lifestyle changes that can help blood pressure. These include being active and not smoking. When should you call for help? Call 911 anytime you think you may need emergency care. This may mean having symptoms that suggest that your blood pressure is causing a serious heart or blood vessel problem. Your blood pressure may be over 180/110. 
 For example, call 911 if: 
  · You have symptoms of a heart attack. These may include: ¨ Chest pain or pressure, or a strange feeling in the chest. 
¨ Sweating. ¨ Shortness of breath. ¨ Nausea or vomiting. ¨ Pain, pressure, or a strange feeling in the back, neck, jaw, or upper belly or in one or both shoulders or arms. ¨ Lightheadedness or sudden weakness. ¨ A fast or irregular heartbeat.  
  · You have symptoms of a stroke. These may include: 
¨ Sudden numbness, tingling, weakness, or loss of movement in your face, arm, or leg, especially on only one side of your body. ¨ Sudden vision changes. ¨ Sudden trouble speaking. ¨ Sudden confusion or trouble understanding simple statements. ¨ Sudden problems with walking or balance. ¨ A sudden, severe headache that is different from past headaches.  
  · You have severe back or belly pain.  
 Do not wait until your blood pressure comes down on its own. Get help right away. 
 Call your doctor now or seek immediate care if: 
  · Your blood pressure is much higher than normal (such as 180/110 or higher), but you don't have symptoms.  
  · You think high blood pressure is causing symptoms, such as: ¨ Severe headache. ¨ Blurry vision.  
 Watch closely for changes in your health, and be sure to contact your doctor if: 
  · Your blood pressure measures 140/90 or higher at least 2 times. That means the top number is 140 or higher or the bottom number is 90 or higher, or both.  
  · You think you may be having side effects from your blood pressure medicine.  
  · Your blood pressure is usually normal, but it goes above normal at least 2 times. Where can you learn more? Go to http://paige-mignon.info/. Enter V802 in the search box to learn more about \"Acute High Blood Pressure: Care Instructions. \" Current as of: May 10, 2017 Content Version: 11.7 © 6530-7955 WellnessFX. Care instructions adapted under license by SysClass (which disclaims liability or warranty for this information). If you have questions about a medical condition or this instruction, always ask your healthcare professional. [address] any warranty or liability for your use of this information. Learning About Sleeping Well What does sleeping well mean? Sleeping well means getting enough sleep. How much sleep is enough varies among people. The number of hours you sleep is not as important as how you feel when you wake up. If you do not feel refreshed, you probably need more sleep. Another sign of not getting enough sleep is feeling tired during the day. The average total nightly sleep time is 7½ to 8 hours. Healthy adults may need a little more or a little less than this. Why is getting enough sleep important? Getting enough quality sleep is a basic part of good health. When your sleep suffers, your mood and your thoughts can suffer too. You may find yourself feeling more grumpy or stressed. Not getting enough sleep also can lead to serious problems, including injury, accidents, anxiety, and depression. What might cause poor sleeping? Many things can cause sleep problems, including: · Stress. Stress can be caused by fear about a single event, such as giving a speech. Or you may have ongoing stress, such as worry about work or school. · Depression, anxiety, and other mental or emotional conditions. · Changes in your sleep habits or surroundings.  This includes changes that happen where you sleep, such as noise, light, or sleeping in a different bed. It also includes changes in your sleep pattern, such as having jet lag or working a late shift. · Health problems, such as pain, breathing problems, and restless legs syndrome. · Lack of regular exercise. How can you help yourself? Here are some tips that may help you sleep more soundly and wake up feeling more refreshed. Your sleeping area · Use your bedroom only for sleeping and sex. A bit of light reading may help you fall asleep. But if it doesn't, do your reading elsewhere in the house. Don't watch TV in bed. · Be sure your bed is big enough to stretch out comfortably, especially if you have a sleep partner. · Keep your bedroom quiet, dark, and cool. Use curtains, blinds, or a sleep mask to block out light. To block out noise, use earplugs, soothing music, or a \"white noise\" machine. Your evening and bedtime routine · Create a relaxing bedtime routine. You might want to take a warm shower or bath, listen to soothing music, or drink a cup of noncaffeinated tea. · Go to bed at the same time every night. And get up at the same time every morning, even if you feel tired. What to avoid · Limit caffeine (coffee, tea, caffeinated sodas) during the day, and don't have any for at least 4 to 6 hours before bedtime. · Don't drink alcohol before bedtime. Alcohol can cause you to wake up more often during the night. · Don't smoke or use tobacco, especially in the evening. Nicotine can keep you awake. · Don't take naps during the day, especially close to bedtime. · Don't lie in bed awake for too long. If you can't fall asleep, or if you wake up in the middle of the night and can't get back to sleep within 15 minutes or so, get out of bed and go to another room until you feel sleepy. · Don't take medicine right before bed that may keep you awake or make you feel hyper or energized. Your doctor can tell you if your medicine may do this and if you can take it earlier in the day. If you can't sleep · Imagine yourself in a peaceful, pleasant scene. Focus on the details and feelings of being in a place that is relaxing. · Get up and do a quiet or boring activity until you feel sleepy. · Don't drink any liquids after 6 p.m. if you wake up often because you have to go to the bathroom. Where can you learn more? Go to http://paige-mignon.info/. Enter R373 in the search box to learn more about \"Learning About Sleeping Well. \" Current as of: December 7, 2017 Content Version: 11.7 © 0208-2497 BetterLesson. Care instructions adapted under license by YesWeAd (which disclaims liability or warranty for this information). If you have questions about a medical condition or this instruction, always ask your healthcare professional. [address] any warranty or liability for your use of this information. Safe Use of Opioid Pain Medicine: Care Instructions Your Care Instructions Pain is your body's way of warning you that something is wrong. Pain feels different for everybody. Only you can describe your pain. A doctor can suggest or prescribe many types of medicines for pain. These range from over-the-counter medicines like acetaminophen (Tylenol) to powerful medicines called opioids. Examples of opioids are fentanyl, hydrocodone, morphine, and oxycodone. Heroin is an illegal opioid Opioids are strong medicines. They can help you manage pain when you use them the right way. But if you misuse them, they can cause serious harm and even death. For these reasons, doctors are very careful about how they prescribe opioids. If you decide to take opioids, here are some things to remember. · Keep your doctor informed. You can get addicted to opioids. The risk is higher if you have a history of substance use. Your doctor will monitor you closely for signs of misuse and addiction and to figure out when you no longer need to take opioids. · Make a treatment plan. The goal of your plan is to be able to function and do the things you need to do, even if you still have some pain. You might be able to manage your pain with other non-opioid options like physical therapy, relaxation, or over-the-counter pain medicines. · Be aware of the side effects. Opioids can cause serious side effects, such as constipation, dry mouth, and nausea. And over time, you may need a higher dose to get pain relief. This is called tolerance. Your body also gets used to opioids. This is called physical dependence. If you suddenly stop taking them, you may have withdrawal symptoms. The doctor carefully considered what pain medicine is right for you. You may not have received opioids if your doctor was concerned about drug interactions or your safety, or if he or she had other concerns. It is best to have one doctor or clinic treat your pain. This way you will get the pain medicine that will help you the most. And a doctor will be able to watch for any problems that the medicine might cause. The doctor has checked you carefully, but problems can develop later. If you notice any problems or new symptoms,  get medical treatment right away. Follow-up care is a key part of your treatment and safety. Be sure to make and go to all appointments, and call your doctor if you are having problems. It's also a good idea to know your test results and keep a list of the medicines you take. How can you care for yourself at home? · If you need to take opioids to manage your pain, remember these safety tips. ¨ Follow directions carefully. It's easy to misuse opioids if you take a dose other than what's prescribed by your doctor. This can lead to overdose and even death. Even sharing them with someone they weren't meant for is misuse. ¨ Be cautious. Opioids may affect your judgment and decision making. Do not drive or operate machinery until you can think clearly.  Talk with your doctor about when it is safe to drive. ¨ Reduce the risk of drug interactions. Opioids can be dangerous if you take them with alcohol or with certain drugs like sleeping pills and muscle relaxers. Make sure your doctor knows about all the other medicines you take, including over-the-counter medicines. Don't start any new medicines before you talk to your doctor or pharmacist. 
[facility] Keep others safe. Store opioids in a safe and secure place. Make sure that pets, children, friends, and family can't get to them. When you're done using opioids, make sure to properly dispose of them. You can either use a community drug take-back program or your drugstore's mail-back program. If one of these programs isn't available, you can flush opioid skin patches and unused opioid pills down the toilet. ¨ Reduce the risk of overdose. Misuse of opioids can be very dangerous. Protect yourself by asking your doctor about a naloxone rescue kit. It can help you-and even save your life-if you take too much of an opioid. · Try other ways to reduce pain. ¨ Relax, and reduce stress. Relaxation techniques such as deep breathing or meditation can help. ¨ Keep moving. Gentle, daily exercise can help reduce pain over the long run. Try low- or no-impact exercises such as walking, swimming, and stationary biking. Do stretches to stay flexible. ¨ Try heat, cold packs, and massage. ¨ Get enough sleep. Pain can make you tired and drain your energy. Talk with your doctor if you have trouble sleeping because of pain. ¨ Think positive. Your thoughts can affect your pain level. Do things that you enjoy to distract yourself when you have pain instead of focusing on the pain. See a movie, read a book, listen to music, or spend time with a friend. · If you are not taking a prescription pain medicine, ask your doctor if you can take an over-the-counter medicine. When should you call for help? Call your doctor now or seek immediate medical care if:   · You have a new kind of pain.  
  · You have new symptoms, such as a fever or rash, along with the pain.  
 Watch closely for changes in your health, and be sure to contact your doctor if: 
  · You think you might be using too much pain medicine, and you need help to use less or stop.  
  · Your pain gets worse.  
  · You would like a referral to a doctor or clinic that specializes in pain management. Where can you learn more? Go to http://paige-mignon.info/. Enter R108 in the search box to learn more about \"Safe Use of Opioid Pain Medicine: Care Instructions. \" Current as of: September 10, 2017 Content Version: 11.7 © 8970-4887 TalkApolis. Care instructions adapted under license by Ubersnap (which disclaims liability or warranty for this information). If you have questions about a medical condition or this instruction, always ask your healthcare professional. [address] any warranty or liability for your use of this information. Introducing [facility]! Katherine Whatley introduces EyeSee360 patient portal. Now you can access parts of your medical record, email your doctor's office, and request medication refills online. 1. In your internet browser, go to [email or web address] 2. Click on the First Time User? Click Here link in the Sign In box. You will see the New Member Sign Up page. 3. Enter your EyeSee360 Access Code exactly as it appears below. You will not need to use this code after youve completed the sign-up process. If you do not sign up before the expiration date, you must request a new code. · EyeSee360 Access Code: [email or web address] Expires: 10/4/2018  3:09 PM 
 
4. Enter the last four digits of your Social Security Number (xxxx) and Date of Birth (mm/dd/yyyy) as indicated and click Submit. You will be taken to the next sign-up page. 5. Create a Televerde ID. This will be your Televerde login ID and cannot be changed, so think of one that is secure and easy to remember. 6. Create a Televerde password. You can change your password at any time. 7. Enter your Password Reset Question and Answer. This can be used at a later time if you forget your password. 8. Enter your e-mail address. You will receive e-mail notification when new information is available in [address] 9. Click Sign Up. You can now view and download portions of your medical record. 10. Click the Download Summary menu link to download a portable copy of your medical information. If you have questions, please visit the Frequently Asked Questions section of the Televerde website. Remember, Televerde is NOT to be used for urgent needs. For medical emergencies, dial 911. Now available from your iPhone and Android! Please provide this summary of care documentation to your next provider. Your primary care clinician is listed as Alycia Ross. If you have any questions after today's visit, please call [phone].

## 2018-08-07 NOTE — PROGRESS NOTES
Referral date , source  . Social History     Social History    Marital status:      Spouse name: N/A    Number of children: N/A    Years of education: N/A     Occupational History    Not on file. Social History Main Topics    Smoking status: Current Every Day Smoker     Packs/day: 1.50    Smokeless tobacco: Never Used    Alcohol use Yes      Comment: Rarely    Drug use: No    Sexual activity: Not on file     Other Topics Concern    Not on file     Social History Narrative     Family History   Problem Relation Age of Onset    Diabetes Other     Hypertension Other     Coronary Artery Disease Other     Cancer Other     Bipolar Disorder Other     Seizures Other      Allergies   Allergen Reactions    Ceftin [Cefuroxime Axetil] Itching    Lamotrigine Other (comments)     Increased Tremors    Norvasc [Amlodipine] Not Reported This Time    Pcn [Penicillins] Hives    Procardia [Nifedipine] Other (comments)     Headache, Palpitations, and kidney pain    Sulfa (Sulfonamide Antibiotics) Other (comments)     GI problems    Trazodone Palpitations     Past Medical History:   Diagnosis Date    Asthma     Bipolar affective disorder ([facility] [address]     Chicken pox     Collar bone fracture     Depression     GERD (gastroesophageal reflux disease)     Headache(784.0)     Hypertension     Insomnia     Measles     Mumps     Panic attacks     Ribs, multiple fractures      Past Surgical History:   Procedure Laterality Date    HX APPENDECTOMY      HX CHOLECYSTECTOMY  2017    HX GYN      Ovarian cystectomy    HX GYN      Stillbirths, miscarriage,     HX OOPHORECTOMY      HX THORACOTOMY      HX TONSILLECTOMY      HX TUBAL LIGATION       Current Outpatient Prescriptions on File Prior to Visit   Medication Sig    hydroCHLOROthiazide (HYDRODIURIL) 12.5 mg tablet Take 12.5 mg by mouth daily.  diazePAM (VALIUM) 5 mg tablet Take one tab po qd prn muscle spasms/sleep.   Indications: MUSCLE SPASM    metoprolol succinate (TOPROL-XL) 25 mg XL tablet Take 25 mg by mouth daily.  ibuprofen (MOTRIN IB) 200 mg tablet Take 400 mg by mouth every eight (8) hours as needed.  losartan (COZAAR) 25 mg tablet Take  by mouth daily.  zolpidem (AMBIEN) 10 mg tablet Take  by mouth nightly as needed.  Levocetirizine (XYZAL) 5 mg tablet Take  by mouth daily.  albuterol (VENTOLIN HFA) 90 mcg/Actuation inhaler Take 1 Puff by inhalation.  naloxone 4 mg/actuation spry 4 mg by Nasal route as needed for up to 2 doses. Indications: OPIOID TOXICITY    guaiFENesin (ROBITUSSIN) 100 mg/5 mL liquid Take 200 mg by mouth three (3) times daily as needed for Cough.  benzonatate (TESSALON) 200 mg capsule Take 200 mg by mouth three (3) times daily as needed.  ERGOCALCIFEROL, VITAMIN D2, (VITAMIN D PO) Take  by mouth.  ACETAMINOPHEN (TYLENOL PO) Take  by mouth.  IBANDRONATE SODIUM (BONIVA PO) Take  by mouth. No current facility-administered medications on file prior to visit. HPI:  Doug Monroy Case is a 74 y.o. 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 Pt was last seen here on 4/23/2018. Pt denies interval changes in the character or distribution of pain but is reporting increased intensity and increased duration. Pain is located left incisional scar from thoracotomy ion 2001. She also has pain located from approximately mid axillary line wrapping anteriorly from approximately T of 5 dermatome to about T9 dermatome where she had rib fractures in the year 2000. She also reports sensation of rib slippage frequently on the right side and the right side pain is far worse than left side pain. She denies having any interventional procedures to directly address the pain on the left side along the incision or to address the pain on the right-sided open fracture sites.     She has been on opioid therapy for this pain since approximately 2005. ROS:    Opioid specific risk: Concurrent use of multiple benzodiazepines, asthma/COPD, GERD, anxiety, tobacco use disorder, depression. Vitals:    08/07/18 1258   BP: (!) 195/92   Pulse: (!) 58   Resp: 15   Temp: 97.6 °F (36.4 °C)   TempSrc: Oral   Weight: 65.8 kg (145 lb)   Height: 5' 7\" (1.702 m)   PainSc:   2   PainLoc: Rib Cage        PE:  AFVSS elevated blood pressure, no acute distress, normal body habitus. A&OXs 3.  normocephalic, atraumatic. Conjugate gaze. Speech was clear and appropriate, patient was cooperative. Mood was appropriate    Lungs: Clear to auscultation bilaterally, nonlabored, equal excursion  Heart: Regular rate and rhythm, positive S1 and S2, no murmur noted  Abdomen: Positive bowel sounds, soft, nontender    Left side thoracic incisional scar  Tender to palpation right-sided ribs  Gait is within functional limits. Balance is within functional limits. Calculated MEQ - 70  Naloxone rescue - yes  Prophylactic bowel program - yes  Date of last OCA 1/18/18. Last UDS 4/19/2018, inconsistent secondary to undisclosed coating  , date checked today, findings consistent although she required a bridging dose of opioid medications from her primary care provider prior to today's appointment. Primary Care Physician  [address] [place] [address]  [phone]      GIC-5 and 3    MARCO -14%    COMM-11    PHQ -- . PHQ over the last two weeks 8/7/2018   PHQ Not Done -   Little interest or pleasure in doing things Nearly every day   Feeling down, depressed, irritable, or hopeless Nearly every day   Total Score PHQ 2 6       Assessment/Plan:     ICD-10-CM ICD-9-CM    1. Intractable intercostal neuropathic pain G54.8 353.8 capsaicin (QUTENZA) 8 % kit topical   2. Encounter for long-term (current) use of high-risk medication Z79.899 V58.69 DRUG SCREEN      AMB POC DRUG SCREEN ()   3.  Rib pain R07.81 786.50 fentaNYL (DURAGESIC) 25 mcg/hr PATCH      fentaNYL (DURAGESIC) 12 mcg/hr patch      HYDROcodone-acetaminophen (NORCO)  mg tablet      HYDROcodone-acetaminophen (NORCO) 5-325 mg per tablet   4. Chronic pain syndrome G89.4 338.4 fentaNYL (DURAGESIC) 25 mcg/hr PATCH      fentaNYL (DURAGESIC) 12 mcg/hr patch      HYDROcodone-acetaminophen (NORCO)  mg tablet      HYDROcodone-acetaminophen (NORCO) 5-325 mg per tablet   5. Chronic bilateral low back pain without sciatica M54.5 724.2     G89.29 338.29       Patient's personal phone number for coordination purposes  [phone]  Patient with chronic left the right side is far worse than the left side. 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 May consider multiple intercostal nerve blocks in the future and may also consider pulsed radiofrequency treatment at the same levels if effective. Prior to that we will try to obtain Qutenza patch and apply the patch to the right sided rib pain and neuralgia in the future within the clinic. --Today refill fentanyl 25mcg next time 12.5mcg q72h for 30 days and then discontinue.  -Today we supplied hydrocodone 10/325 once daily as needed for pain , 30 tabs given. --at refill change to norco 5/325 up to 4 times daily wjith 100 tabs to be budgeted over 30 days. --Qutenza right side. Patient to arrange for clinic follow-up and application for any time after she obtains the Qutenza patch from the pharmacy. --Consider chiropractic treatment. --Patient to follow-up as soon as possible with primary care regarding elevated blood pressure, depression, and dysphagia. GOALS:  To establish complementary and integrative plan of care to address chronic pain issues while minimizing pharmaceuticals to maximize patient's function improve quality of life.     Education:  Patient again educated on the importance of strict compliance with the opioid care agreement while on opioid therapy. Patient also again educated that they should avoid driving while on chronic opioid therapy. Also advised to avoid alcohol and to avoid benzodiazepines while on opioid therapy. Patient Homework:  Continue to independently investigate yoga for persons with chronic pain. F/u:. Follow-up Disposition:  Return in about 2 months (around 10/7/2018).

## 2018-09-05 ENCOUNTER — TELEPHONE (OUTPATIENT)
Dept: PAIN MANAGEMENT | Age: 68
End: 2018-09-05

## 2018-09-05 NOTE — TELEPHONE ENCOUNTER
Sammi Orellana Case has called requesting a refill of their controlled medication, fentanyl and norco, for the management of her chronic rib pain. Last office visit date: 08/07/18    Date last  was pulled and reviewed : 09/05/18, last fill date for both meds was 08/09/18. Was the patient compliant when the above report was pulled? Yes, pt still getting ambien from an outside provider    Analgesia: pt reports a 60% pain relief with her current opioid medication regimen    Aberrancies: none noted    ADL's: pt reports being able to perform her normal daily duties while taking her medication     Adverse Reaction: none reported by the pt at this time. Provider's last note and plan of care reviewed? Yes, pre-printed prescriptions  Request forwarded to provider for review.

## 2018-09-05 NOTE — TELEPHONE ENCOUNTER
Patient called on refill of medicine [id number] 1:24pm    1. Name, verified  2. Medicine requested - fentanyl, norco  3. Both phone number for patient are good  4. Percentage of pain relief our medicines provide - 60  5. ADL- yes  6.   Side effects from the medicine provided - none

## 2018-09-06 NOTE — TELEPHONE ENCOUNTER
Called pt on both lines and she was not able to be reached. A message was left on both numbers and the office number was provided for the pt.  She was asked to contact the office about her request.

## 2018-10-03 ENCOUNTER — OFFICE VISIT (OUTPATIENT)
Dept: PAIN MANAGEMENT | Age: 68
End: 2018-10-03

## 2018-10-03 VITALS
TEMPERATURE: 97.9 F | WEIGHT: 145 LBS | DIASTOLIC BLOOD PRESSURE: 90 MMHG | HEIGHT: 67 IN | HEART RATE: 69 BPM | SYSTOLIC BLOOD PRESSURE: 191 MMHG | RESPIRATION RATE: 14 BRPM | BODY MASS INDEX: 22.76 KG/M2

## 2018-10-03 DIAGNOSIS — G89.4 CHRONIC PAIN SYNDROME: ICD-10-CM

## 2018-10-03 DIAGNOSIS — Z79.899 ENCOUNTER FOR LONG-TERM (CURRENT) USE OF HIGH-RISK MEDICATION: Primary | ICD-10-CM

## 2018-10-03 DIAGNOSIS — R07.81 RIB PAIN: ICD-10-CM

## 2018-10-03 DIAGNOSIS — M54.50 CHRONIC BILATERAL LOW BACK PAIN WITHOUT SCIATICA: ICD-10-CM

## 2018-10-03 DIAGNOSIS — G89.29 CHRONIC BILATERAL LOW BACK PAIN WITHOUT SCIATICA: ICD-10-CM

## 2018-10-03 DIAGNOSIS — G58.0 INTRACTABLE INTERCOSTAL NEUROPATHIC PAIN: ICD-10-CM

## 2018-10-03 RX ORDER — HYDRALAZINE HYDROCHLORIDE 25 MG/1
25 TABLET, FILM COATED ORAL 2 TIMES DAILY
COMMUNITY

## 2018-10-03 RX ORDER — HYDROCODONE BITARTRATE AND ACETAMINOPHEN 10; 325 MG/1; MG/1
1 TABLET ORAL
Qty: 100 TAB | Refills: 0 | Status: SHIPPED | OUTPATIENT
Start: 2018-10-07 | End: 2018-11-06

## 2018-10-03 RX ORDER — CAPSAICIN 0.1 %
CREAM (GRAM) TOPICAL
Qty: 42.5 G | Refills: 2 | Status: SHIPPED | OUTPATIENT
Start: 2018-10-03

## 2018-10-03 NOTE — PATIENT INSTRUCTIONS
Safe Use of Opioid Pain Medicine: Care Instructions Your Care Instructions Pain is your body's way of warning you that something is wrong. Pain feels different for everybody. Only you can describe your pain. A doctor can suggest or prescribe many types of medicines for pain. These range from over-the-counter medicines like acetaminophen (Tylenol) to powerful medicines called opioids. Examples of opioids are fentanyl, hydrocodone, morphine, and oxycodone. Heroin is an illegal opioid Opioids are strong medicines. They can help you manage pain when you use them the right way. But if you misuse them, they can cause serious harm and even death. For these reasons, doctors are very careful about how they prescribe opioids. If you decide to take opioids, here are some things to remember. · Keep your doctor informed. You can get addicted to opioids. The risk is higher if you have a history of substance use. Your doctor will monitor you closely for signs of misuse and addiction and to figure out when you no longer need to take opioids. · Make a treatment plan. The goal of your plan is to be able to function and do the things you need to do, even if you still have some pain. You might be able to manage your pain with other non-opioid options like physical therapy, relaxation, or over-the-counter pain medicines. · Be aware of the side effects. Opioids can cause serious side effects, such as constipation, dry mouth, and nausea. And over time, you may need a higher dose to get pain relief. This is called tolerance. Your body also gets used to opioids. This is called physical dependence. If you suddenly stop taking them, you may have withdrawal symptoms. The doctor carefully considered what pain medicine is right for you. You may not have received opioids if your doctor was concerned about drug interactions or your safety, or if he or she had other concerns. It is best to have one doctor or clinic treat your pain. This way you will get the pain medicine that will help you the most. And a doctor will be able to watch for any problems that the medicine might cause. The doctor has checked you carefully, but problems can develop later. If you notice any problems or new symptoms,  get medical treatment right away. Follow-up care is a key part of your treatment and safety. Be sure to make and go to all appointments, and call your doctor if you are having problems. It's also a good idea to know your test results and keep a list of the medicines you take. How can you care for yourself at home? · If you need to take opioids to manage your pain, remember these safety tips. ¨ Follow directions carefully. It's easy to misuse opioids if you take a dose other than what's prescribed by your doctor. This can lead to overdose and even death. Even sharing them with someone they weren't meant for is misuse. ¨ Be cautious. Opioids may affect your judgment and decision making. Do not drive or operate machinery until you can think clearly. Talk with your doctor about when it is safe to drive. ¨ Reduce the risk of drug interactions. Opioids can be dangerous if you take them with alcohol or with certain drugs like sleeping pills and muscle relaxers. Make sure your doctor knows about all the other medicines you take, including over-the-counter medicines. Don't start any new medicines before you talk to your doctor or pharmacist. 
Tito Mcgraw Keep others safe. Store opioids in a safe and secure place. Make sure that pets, children, friends, and family can't get to them. When you're done using opioids, make sure to properly dispose of them. You can either use a community drug take-back program or your drugstore's mail-back program. If one of these programs isn't available, you can flush opioid skin patches and unused opioid pills down the toilet. ¨ Reduce the risk of overdose. Misuse of opioids can be very dangerous. Protect yourself by asking your doctor about a naloxone rescue kit. It can help you-and even save your life-if you take too much of an opioid. · Try other ways to reduce pain. ¨ Relax, and reduce stress. Relaxation techniques such as deep breathing or meditation can help. ¨ Keep moving. Gentle, daily exercise can help reduce pain over the long run. Try low- or no-impact exercises such as walking, swimming, and stationary biking. Do stretches to stay flexible. ¨ Try heat, cold packs, and massage. ¨ Get enough sleep. Pain can make you tired and drain your energy. Talk with your doctor if you have trouble sleeping because of pain. ¨ Think positive. Your thoughts can affect your pain level. Do things that you enjoy to distract yourself when you have pain instead of focusing on the pain. See a movie, read a book, listen to music, or spend time with a friend. · If you are not taking a prescription pain medicine, ask your doctor if you can take an over-the-counter medicine. When should you call for help? Call your doctor now or seek immediate medical care if: 
  · You have a new kind of pain.  
  · You have new symptoms, such as a fever or rash, along with the pain.  
 Watch closely for changes in your health, and be sure to contact your doctor if: 
  · You think you might be using too much pain medicine, and you need help to use less or stop.  
  · Your pain gets worse.  
  · You would like a referral to a doctor or clinic that specializes in pain management. Where can you learn more? Go to http://paige-mignon.info/. Enter R108 in the search box to learn more about \"Safe Use of Opioid Pain Medicine: Care Instructions. \" Current as of: September 10, 2017 Content Version: 11.7 © 5837-3119 [employer].  Care instructions adapted under license by [employer] (which disclaims liability or warranty for this information). If you have questions about a medical condition or this instruction, always ask your healthcare professional. [address] any warranty or liability for your use of this information. Learning About Benefits From Quitting Smoking How does quitting smoking make you healthier? If you're thinking about quitting smoking, you may have a few reasons to be smoke-free. Your health may be one of them. · When you quit smoking, you lower your risks for cancer, lung disease, heart attack, stroke, blood vessel disease, and blindness from macular degeneration. · When you're smoke-free, you get sick less often, and you heal faster. You are less likely to get colds, flu, bronchitis, and pneumonia. · As a nonsmoker, you may find that your mood is better and you are less stressed. When and how will you feel healthier? Quitting has real health benefits that start from day 1 of being smoke-free. And the longer you stay smoke-free, the healthier you get and the better you feel. The first hours · After just 20 minutes, your blood pressure and heart rate go down. That means there's less stress on your heart and blood vessels. · Within 12 hours, the level of carbon monoxide in your blood drops back to normal. That makes room for more oxygen. With more oxygen in your body, you may notice that you have more energy than when you smoked. After 2 weeks · Your lungs start to work better. · Your risk of heart attack starts to drop. After 1 month · When your lungs are clear, you cough less and breathe deeper, so it's easier to be active. · Your sense of taste and smell return. That means you can enjoy food more than you have since you started smoking. Over the years · After 1 year, your risk of heart disease is half what it would be if you kept smoking. · After 5 years, your risk of stroke starts to shrink.  Within a few years after that, it's about the same as if you'd never smoked. · After 10 years, your risk of dying from lung cancer is cut by about half. And your risk for many other types of cancer is lower too. How would quitting help others in your life? When you quit smoking, you improve the health of everyone who now breathes in your smoke. · Their heart, lung, and cancer risks drop, much like yours. · They are sick less. For babies and small children, living smoke-free means they're less likely to have ear infections, pneumonia, and bronchitis. · If you're a woman who is or will be pregnant someday, quitting smoking means a healthier . · Children who are close to you are less likely to become adult smokers. Where can you learn more? Go to http://paige-mignon.info/. Enter [id number] [id number] [id number] 11 in the search box to learn more about \"Learning About Benefits From Quitting Smoking. \" Current as of: 2017 Content Version: 11.7 © 0808-8915 [employer]. Care instructions adapted under license by [employer] (which disclaims liability or warranty for this information). If you have questions about a medical condition or this instruction, always ask your healthcare professional. [place] [address] any warranty or liability for your use of this information.

## 2018-10-03 NOTE — PROGRESS NOTES
Nursing Notes Patient presents to the office today in follow-up. Patient rates her pain at 4/10 on the numerical pain scale. Reviewed medications with counts as follows:   
Rx Date filled Qty Dispensed Pill Count Last Dose Short Fentanyl 12 mcg/hr  09/08/18 10 2+1 on  Current patch on right breast per pt  no  
norco 5/325 mg  09/08/18 100 59 This a.m. no  
       
       
       
Last opioid agreement 01/18/18 Last urine drug screen 08/07/18 Comments: POC UDS was not performed in office today Any new labs or imaging since last appointment? NO Have you been to an emergency room (ER) or urgent care clinic since your last visit? NO Have you been hospitalized since your last visit? NO If yes, where, when, and reason for visit? Have you seen or consulted any other health care providers outside of the [address] [place] [address]  since your last visit? NO If yes, where, when, and reason for visit? Ms. Gutierrez has a reminder for a \"due or due soon\" health maintenance. I have asked that she contact her primary care provider for follow-up on this health maintenance. PHQ over the last two weeks 10/3/2018 PHQ Not Done Patient Decline Little interest or pleasure in doing things - Feeling down, depressed, irritable, or hopeless - Total Score PHQ 2 - Pt states she is bipolar and sees her psychiatrist regularly. She does not feel like these questions are appropriate for someone with bipolar. She is declining to answer questions. Provider made aware.

## 2018-10-03 NOTE — MR AVS SNAPSHOT
[address] [facility] [address] 
[phone] Patient: Thony Sanchez Case MRN: [id number] Visit Information Date & Time Provider Department Dept. Phone Encounter #  
 10/3/2018 10:45 AM Jona Daniels [facility] [id number] Follow-up Instructions Return in about 3 months (around 1/3/2019) for 30 min. Your Appointments 12/27/2018 10:30 AM  
Office Visit with HAYDEE Lopez [facility] (RAFI SCHEDULING) Appt Note: 3 mon f/u per ks. ..to  
 [address] [address]  
[phone] [facility] [id number] Upcoming Health Maintenance Date Due Hepatitis C Screening 1950 DTaP/Tdap/Td series (1 - Tdap) 8/9/1973 Shingrix Vaccine Age 50> (1 of 2) 8/9/2002 BREAST CANCER SCRN MAMMOGRAM 8/9/2002 FOBT Q 1 YEAR AGE 50-75 8/9/2002 GLAUCOMA SCREENING Q2Y 8/9/2017 Pneumococcal 65+ Low/Medium Risk (1 of 2 - PCV13) 8/9/2017 MEDICARE YEARLY EXAM 3/14/2018 Influenza Age 5 to Adult 8/1/2018 Allergies as of 10/3/2018  Review Complete On: 10/3/2018 By: Ana Maria Tee LPN Severity Noted Reaction Type Reactions Ceftin [Cefuroxime Axetil]    Itching Lamotrigine  01/18/2018    Other (comments) Increased Tremors Norvasc [Amlodipine]    Not Reported This Time  
 Pcn [Penicillins]    Hives Procardia [Nifedipine]  01/18/2018    Other (comments) Headache, Palpitations, and kidney pain  
 Sulfa (Sulfonamide Antibiotics)    Other (comments) GI problems Trazodone  01/18/2018    Palpitations Current Immunizations  Never Reviewed No immunizations on file. Not reviewed this visit You Were Diagnosed With   
  
 Codes Comments Encounter for long-term (current) use of high-risk medication    -  Primary ICD-10-CM: Q01.745 ICD-9-CM: V58.69 Rib pain     ICD-10-CM: R07.81 ICD-9-CM: 786.50 Chronic pain syndrome     ICD-10-CM: G89.4 ICD-9-CM: 338. 4 Chronic bilateral low back pain without sciatica     ICD-10-CM: M54.5, G89.29 ICD-9-CM: 724.2, 338.29 Vitals BP Pulse Temp Resp Height(growth percentile) Weight(growth percentile) 191/90 (BP 1 Location: Right arm, BP Patient Position: Sitting) 69 97.9 °F (36.6 °C) (Oral) 14 5' 7\" (1.702 m) 145 lb (65.8 kg) BMI OB Status Smoking Status 22.71 kg/m2 Postmenopausal Current Every Day Smoker Vitals History BMI and BSA Data Body Mass Index Body Surface Area  
 22.71 kg/m 2 1.76 m 2 Your Updated Medication List  
  
   
This list is accurate as of 10/3/18 11:55 AM.  Always use your most recent med list.  
  
  
  
  
 AMBIEN 10 mg tablet Generic drug:  zolpidem Take 12.5 mg by mouth nightly as needed. benzonatate 200 mg capsule Commonly known as:  TESSALON Take 200 mg by mouth three (3) times daily as needed. BONIVA PO Take  by mouth.  
  
 capsaicin 0.1 % topical cream  
Commonly known as:  CAPZASIN-HP Apply cream to right rib area TID. Avoid use on damaged, broken or irritated skin. Avoid occlusive dressings or heat while using this medication. COZAAR 25 mg tablet Generic drug:  losartan Take 100 mg by mouth daily. diazePAM 5 mg tablet Commonly known as:  VALIUM Take one tab po qd prn muscle spasms/sleep. Indications: MUSCLE SPASM  
  
 guaiFENesin 100 mg/5 mL liquid Commonly known as:  ROBITUSSIN Take 200 mg by mouth three (3) times daily as needed for Cough. hydrALAZINE 25 mg tablet Commonly known as:  APRESOLINE Take 25 mg by mouth two (2) times a day. hydroCHLOROthiazide 12.5 mg tablet Commonly known as:  HYDRODIURIL Take 12.5 mg by mouth daily. HYDROcodone-acetaminophen  mg tablet Commonly known as:  Ky Francitas  
 Take 1 Tab by mouth every eight (8) hours as needed for Pain for up to 30 days. Max Daily Amount: 3 Tabs. Start taking on:  10/7/2018  
  
 metoprolol succinate 25 mg XL tablet Commonly known as:  TOPROL-XL Take 25 mg by mouth daily. MOTRIN  mg tablet Generic drug:  ibuprofen Take 400 mg by mouth every eight (8) hours as needed. naloxone 4 mg/actuation nasal spray Commonly known as:  NARCAN  
4 mg by Nasal route as needed for up to 2 doses. Indications: OPIOID TOXICITY TYLENOL PO Take 500 mg by mouth three (3) times daily as needed. VENTOLIN HFA 90 mcg/actuation inhaler Generic drug:  albuterol Take 1 Puff by inhalation every six (6) hours as needed. VITAMIN D2 PO Take  by mouth. XYZAL 5 mg tablet Generic drug:  levocetirizine Take  by mouth daily. Prescriptions Printed Refills HYDROcodone-acetaminophen (NORCO)  mg tablet 0 Starting on: 10/7/2018 Sig: Take 1 Tab by mouth every eight (8) hours as needed for Pain for up to 30 days. Max Daily Amount: 3 Tabs. Class: Print Route: Oral  
 capsaicin (CAPZASIN-HP) 0.1 % topical cream 2 Sig: Apply cream to right rib area TID. Avoid use on damaged, broken or irritated skin. Avoid occlusive dressings or heat while using this medication. Class: Print We Performed the Following REFERRAL TO PAIN MANAGEMENT [CEY585 Custom] Comments:  
 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 Please evaluate and treat. Follow-up Instructions Return in about 3 months (around 1/3/2019) for 30 min. Referral Information Referral ID Referred By Referred To  
  
 [id number] Faustina Troy MD   
   [facility] [address] Phone: [phone] Fax: [phone] Visits Status Start Date End Date 1 New Request 10/3/18 10/3/19 If your referral has a status of pending review or denied, additional information will be sent to support the outcome of this decision. Patient Instructions Safe Use of Opioid Pain Medicine: Care Instructions Your Care Instructions Pain is your body's way of warning you that something is wrong. Pain feels different for everybody. Only you can describe your pain. A doctor can suggest or prescribe many types of medicines for pain. These range from over-the-counter medicines like acetaminophen (Tylenol) to powerful medicines called opioids. Examples of opioids are fentanyl, hydrocodone, morphine, and oxycodone. Heroin is an illegal opioid Opioids are strong medicines. They can help you manage pain when you use them the right way. But if you misuse them, they can cause serious harm and even death. For these reasons, doctors are very careful about how they prescribe opioids. If you decide to take opioids, here are some things to remember. · Keep your doctor informed. You can get addicted to opioids. The risk is higher if you have a history of substance use. Your doctor will monitor you closely for signs of misuse and addiction and to figure out when you no longer need to take opioids. · Make a treatment plan. The goal of your plan is to be able to function and do the things you need to do, even if you still have some pain. You might be able to manage your pain with other non-opioid options like physical therapy, relaxation, or over-the-counter pain medicines. · Be aware of the side effects. Opioids can cause serious side effects, such as constipation, dry mouth, and nausea. And over time, you may need a higher dose to get pain relief. This is called tolerance. Your body also gets used to opioids. This is called physical dependence. If you suddenly stop taking them, you may have withdrawal symptoms. The doctor carefully considered what pain medicine is right for you. You may not have received opioids if your doctor was concerned about drug interactions or your safety, or if he or she had other concerns. It is best to have one doctor or clinic treat your pain. This way you will get the pain medicine that will help you the most. And a doctor will be able to watch for any problems that the medicine might cause. The doctor has checked you carefully, but problems can develop later. If you notice any problems or new symptoms,  get medical treatment right away. Follow-up care is a key part of your treatment and safety. Be sure to make and go to all appointments, and call your doctor if you are having problems. It's also a good idea to know your test results and keep a list of the medicines you take. How can you care for yourself at home? · If you need to take opioids to manage your pain, remember these safety tips. ¨ Follow directions carefully. It's easy to misuse opioids if you take a dose other than what's prescribed by your doctor. This can lead to overdose and even death. Even sharing them with someone they weren't meant for is misuse. ¨ Be cautious. Opioids may affect your judgment and decision making. Do not drive or operate machinery until you can think clearly. Talk with your doctor about when it is safe to drive. ¨ Reduce the risk of drug interactions. Opioids can be dangerous if you take them with alcohol or with certain drugs like sleeping pills and muscle relaxers. Make sure your doctor knows about all the other medicines you take, including over-the-counter medicines. Don't start any new medicines before you talk to your doctor or pharmacist. 
Syble Hals Keep others safe. Store opioids in a safe and secure place. Make sure that pets, children, friends, and family can't get to them. When you're done using opioids, make sure to properly dispose of them.  You can either use a community drug take-back program or your drugstore's mail-back program. If one of these programs isn't available, you can flush opioid skin patches and unused opioid pills down the toilet. ¨ Reduce the risk of overdose. Misuse of opioids can be very dangerous. Protect yourself by asking your doctor about a naloxone rescue kit. It can help you-and even save your life-if you take too much of an opioid. · Try other ways to reduce pain. ¨ Relax, and reduce stress. Relaxation techniques such as deep breathing or meditation can help. ¨ Keep moving. Gentle, daily exercise can help reduce pain over the long run. Try low- or no-impact exercises such as walking, swimming, and stationary biking. Do stretches to stay flexible. ¨ Try heat, cold packs, and massage. ¨ Get enough sleep. Pain can make you tired and drain your energy. Talk with your doctor if you have trouble sleeping because of pain. ¨ Think positive. Your thoughts can affect your pain level. Do things that you enjoy to distract yourself when you have pain instead of focusing on the pain. See a movie, read a book, listen to music, or spend time with a friend. · If you are not taking a prescription pain medicine, ask your doctor if you can take an over-the-counter medicine. When should you call for help? Call your doctor now or seek immediate medical care if: 
  · You have a new kind of pain.  
  · You have new symptoms, such as a fever or rash, along with the pain.  
 Watch closely for changes in your health, and be sure to contact your doctor if: 
  · You think you might be using too much pain medicine, and you need help to use less or stop.  
  · Your pain gets worse.  
  · You would like a referral to a doctor or clinic that specializes in pain management. Where can you learn more? Go to http://paige-mignon.info/. Enter R108 in the search box to learn more about \"Safe Use of Opioid Pain Medicine: Care Instructions. \" 
 Current as of: September 10, 2017 Content Version: 11.7 © 2693-1360 [email or web address]. Care instructions adapted under license by [employer] (which disclaims liability or warranty for this information). If you have questions about a medical condition or this instruction, always ask your healthcare professional. [address] any warranty or liability for your use of this information. Learning About Benefits From Quitting Smoking How does quitting smoking make you healthier? If you're thinking about quitting smoking, you may have a few reasons to be smoke-free. Your health may be one of them. · When you quit smoking, you lower your risks for cancer, lung disease, heart attack, stroke, blood vessel disease, and blindness from macular degeneration. · When you're smoke-free, you get sick less often, and you heal faster. You are less likely to get colds, flu, bronchitis, and pneumonia. · As a nonsmoker, you may find that your mood is better and you are less stressed. When and how will you feel healthier? Quitting has real health benefits that start from day 1 of being smoke-free. And the longer you stay smoke-free, the healthier you get and the better you feel. The first hours · After just 20 minutes, your blood pressure and heart rate go down. That means there's less stress on your heart and blood vessels. · Within 12 hours, the level of carbon monoxide in your blood drops back to normal. That makes room for more oxygen. With more oxygen in your body, you may notice that you have more energy than when you smoked. After 2 weeks · Your lungs start to work better. · Your risk of heart attack starts to drop. After 1 month · When your lungs are clear, you cough less and breathe deeper, so it's easier to be active. · Your sense of taste and smell return. That means you can enjoy food more than you have since you started smoking. Over the years · After 1 year, your risk of heart disease is half what it would be if you kept smoking. · After 5 years, your risk of stroke starts to shrink. Within a few years after that, it's about the same as if you'd never smoked. · After 10 years, your risk of dying from lung cancer is cut by about half. And your risk for many other types of cancer is lower too. How would quitting help others in your life? When you quit smoking, you improve the health of everyone who now breathes in your smoke. · Their heart, lung, and cancer risks drop, much like yours. · They are sick less. For babies and small children, living smoke-free means they're less likely to have ear infections, pneumonia, and bronchitis. · If you're a woman who is or will be pregnant someday, quitting smoking means a healthier . · Children who are close to you are less likely to become adult smokers. Where can you learn more? Go to http://paige-mignon.info/. Enter [id number] in the search box to learn more about \"Learning About Benefits From Quitting Smoking. \" Current as of: 2017 Content Version: 11.7 © 0490-4691 [employer]. Care instructions adapted under license by [employer] (which disclaims liability or warranty for this information). If you have questions about a medical condition or this instruction, always ask your healthcare professional. [address] [place] [address] any warranty or liability for your use of this information. Introducing \[facility]\"" & HEALTH SERVICES! [employer] introduces [employer] patient portal. Now you can access parts of your medical record, email your doctor's office, and request medication refills online. 1. In your internet browser, go to [email or web address] 2. Click on the First Time User? Click Here link in the Sign In box. You will see the New Member Sign Up page. 3. Enter your Thinkspeed Access Code exactly as it appears below. You will not need to use this code after youve completed the sign-up process. If you do not sign up before the expiration date, you must request a new code. · Thinkspeed Access Code: [email or web address] Expires: 10/4/2018  3:09 PM 
 
4. Enter the last four digits of your Social Security Number (xxxx) and Date of Birth (mm/dd/yyyy) as indicated and click Submit. You will be taken to the next sign-up page. 5. Create a Thinkspeed ID. This will be your Thinkspeed login ID and cannot be changed, so think of one that is secure and easy to remember. 6. Create a Thinkspeed password. You can change your password at any time. 7. Enter your Password Reset Question and Answer. This can be used at a later time if you forget your password. 8. Enter your e-mail address. You will receive e-mail notification when new information is available in [address]. 9. Click Sign Up. You can now view and download portions of your medical record. 10. Click the Download Summary menu link to download a portable copy of your medical information. If you have questions, please visit the Frequently Asked Questions section of the Thinkspeed website. Remember, Thinkspeed is NOT to be used for urgent needs. For medical emergencies, dial 911. Now available from your iPhone and Android! Please provide this summary of care documentation to your next provider. Your primary care clinician is listed as Jessica Shabazz. If you have any questions after today's visit, please call [phone].

## 2018-10-03 NOTE — PROGRESS NOTES
Referral date before 2011, source ? and for rib pain. HPI: 
Nuha Del Rio Case is a 79 y.o. female here for f/u visit for ongoing evaluation of chronic rib pain s/p fractures on one side and post-thoracotomy pain on the other side. Pt was last seen here on 8/7/2018. Pt denies interval changes on the character or distribution of pain but notes increased pain due to medication decreased. Pain is located left incisional scar from thoracotomy in 2001. She alos has pain located from ~mid-axillary line wrapping anteriorly from ~T5 dermatome to ~T9 dermatome where she had rib fractures in 2000. She also reports sensation of rib slippage frequently on the right side and the right side pain is far worse than left side pain. She denies having any interventional procedures to directly address the pain on the left side along the incision or to address the pain on the right-sided open fracture sites. The pain is described as aching and stabbing. Pain at its best is 1/10. Pain at its worse is 8/10. Since last visit, pt was able to follow up with her PCP and she was started on an additional medication for her blood pressure - Hydralazine. Social History Social History  Marital status:  Spouse name: N/A  
 Number of children: N/A  
 Years of education: N/A Occupational History  Not on file. Social History Main Topics  Smoking status: Current Every Day Smoker Packs/day: 1.50  Smokeless tobacco: Never Used  Alcohol use Yes Comment: Rarely  Drug use: No  
 Sexual activity: Not on file Other Topics Concern  Not on file Social History Narrative Family History Problem Relation Age of Onset  Diabetes Other  Hypertension Other  Coronary Artery Disease Other  Cancer Other  Bipolar Disorder Other  Seizures Other Allergies Allergen Reactions  Ceftin [Cefuroxime Axetil] Itching  Lamotrigine Other (comments) Increased Tremors  Norvasc [Amlodipine] Not Reported This Time  Pcn [Penicillins] Hives  Procardia [Nifedipine] Other (comments) Headache, Palpitations, and kidney pain  Sulfa (Sulfonamide Antibiotics) Other (comments) GI problems  Trazodone Palpitations Past Medical History:  
Diagnosis Date  Asthma  Bipolar affective disorder ([facility] [address]  Chicken pox  Collar bone fracture  Depression  GERD (gastroesophageal reflux disease)  Headache(784.0)  Hypertension  Insomnia  Measles  Mumps  Panic attacks  Ribs, multiple fractures Past Surgical History:  
Procedure Laterality Date  HX APPENDECTOMY  HX CHOLECYSTECTOMY  2017  HX GYN Ovarian cystectomy  HX GYN Stillbirths, miscarriage,   HX OOPHORECTOMY  HX THORACOTOMY  HX TONSILLECTOMY  HX TUBAL LIGATION Current Outpatient Prescriptions on File Prior to Visit Medication Sig  
 diazePAM (VALIUM) 5 mg tablet Take one tab po qd prn muscle spasms/sleep. Indications: MUSCLE SPASM  metoprolol succinate (TOPROL-XL) 25 mg XL tablet Take 25 mg by mouth daily.  ibuprofen (MOTRIN IB) 200 mg tablet Take 400 mg by mouth every eight (8) hours as needed.  losartan (COZAAR) 25 mg tablet Take 100 mg by mouth daily.  zolpidem (AMBIEN) 10 mg tablet Take 12.5 mg by mouth nightly as needed.  Levocetirizine (XYZAL) 5 mg tablet Take  by mouth daily.  albuterol (VENTOLIN HFA) 90 mcg/Actuation inhaler Take 1 Puff by inhalation every six (6) hours as needed.  ACETAMINOPHEN (TYLENOL PO) Take 500 mg by mouth three (3) times daily as needed.  hydroCHLOROthiazide (HYDRODIURIL) 12.5 mg tablet Take 12.5 mg by mouth daily.  naloxone 4 mg/actuation spry 4 mg by Nasal route as needed for up to 2 doses. Indications: OPIOID TOXICITY  guaiFENesin (ROBITUSSIN) 100 mg/5 mL liquid Take 200 mg by mouth three (3) times daily as needed for Cough.  benzonatate (TESSALON) 200 mg capsule Take 200 mg by mouth three (3) times daily as needed.  ERGOCALCIFEROL, VITAMIN D2, (VITAMIN D PO) Take  by mouth.  IBANDRONATE SODIUM (BONIVA PO) Take  by mouth. No current facility-administered medications on file prior to visit. ROS: 
Reports shortness of breath, abdominal pain, weakness, depression and anxiety. Denies fever, chills, nausea, vomiting, diarrhea, constipation, chest pain, trouble swallowing, changes in vision, changes in hearing, falls, dizziness, bladder incontinence, bowel incontinence, suicidal ideations, homicidal ideations or alcohol use. Opioid specific risk: concurrent use of multiple benzodiazepines, asthma/COPD, GERD, anxiety, depression, tobacco use disorder, bipolar disorder, insomnia. Opioid specific history: opioid therapy for pain since ~2005. Vitals:  
 10/03/18 1043 BP: 191/90 Pulse: 69 Resp: 14 Temp: 97.9 °F (36.6 °C) TempSrc: Oral  
Weight: 65.8 kg (145 lb) Height: 5' 7\" (1.702 m) PainSc:   4 PainLoc: Rib Cage Labs: BUN/Cr: 12/1.0 on 6/30/2017 AST/ALT: 20/11 on 6/30/2017 Physical exam: 
AFVSS with elevated blood pressure, no acute distress, normal body habitus. A&OXs 3. Normocephalic, atraumatic. Conjugate gaze, clear sclerae. Speech is clear and appropriate. Mood is appropriate and patient is cooperative. Gait and balance are within functional limits. Non-labored breathing. Pt refuses examination of chest/rib area. Calculated MEQ - 50 Naloxone rescue - yes Prophylactic bowel program - yes Date of last OCA 1/18/2018 Last UDS 8/7/2018, consistent  date checked today, findings consistent Primary Care Physician Krista Hayden [address] [facility] [address] [id number] [phone] Today      Last Visit PGIC - 2 and 5    5 and 3 
MARCO - did not complete   14% COMM - 1 with 4 questions omitted  11 PHQ -- . PHQ over the last two weeks 10/3/2018 PHQ Not Done Patient Decline Little interest or pleasure in doing things - Feeling down, depressed, irritable, or hopeless - Total Score PHQ 2 - Assessment/Plan: ICD-10-CM ICD-9-CM 1. Encounter for long-term (current) use of high-risk medication Z79.899 V58.69   
2. Rib pain R07.81 786.50 HYDROcodone-acetaminophen (NORCO)  mg tablet  
   capsaicin (CAPZASIN-HP) 0.1 % topical cream  
   REFERRAL TO PAIN MANAGEMENT 3. Chronic pain syndrome G89.4 338.4 HYDROcodone-acetaminophen (NORCO)  mg tablet  
   capsaicin (CAPZASIN-HP) 0.1 % topical cream  
   REFERRAL TO PAIN MANAGEMENT 4. Chronic bilateral low back pain without sciatica M54.5 724.2 HYDROcodone-acetaminophen (NORCO)  mg tablet G89.29 338.29 capsaicin (CAPZASIN-HP) 0.1 % topical cream  
   REFERRAL TO PAIN MANAGEMENT 5. Intractable intercostal neuropathic pain G54.8 353.8 Do not recommend long term opioid therapy for this patient at this time. Pt currently taking Fentanyl 12mcg/hr patch changed every 72 hours and Norco 5/325mg up to 4 times a day as needed with a total of 100 tabs to be budgeted over 30 days. Their MME is 50. Today, we will continue the weaning of patients opioid medication with a goal of being opioid free, pending safety and compliance. Pt instructed to call if they experience any signs of withdrawal (diarrhea, nausea, vomiting, sweating or chills, agitation, itching). Today, pt given prescription for Norco 10/325mg up to 4 times a day as needed with a total of 100 tabs to be budgeted over 30 days; Fentanyl 12mcg/hr patch was discontinued. Their new MME is 36. Pt instructed to call 4 days before they run out of their medications for refill. At this time pt will be provided with Norco 10/325mg up to 4 times a day as needed with a total of 95 tabs to be budgeted over 30 days, pending safety and compliance.   
 
At following refill, pt will be provided with Norco 10/325mg up to 3 times a day as needed with a total of 90 tabs to be used over 30 days, pending safety and compliance. Their MME will be 30. At next office visit, the plan is to provide patient with Norco 10/325mg up to 3 times a day as needed with a total of 85 tabs to be budgeted over 30 days. If patient has difficulty with the wean or difficulty with cravings we will consider referral to mental health for ongoing assessment and treatment for opioid use disorder. Capsaicin cream ordered today. Pt returns with Qutenza rx that was prescribed at last office visit. She reports that two pharmacies - [facility] and [facility] - did not have it in stock. Advised patient that if her insurance approves the medication the pharmacy will have to special order it. Patient to arrange for clinic follow-up and application for any time after she obtains the Qutenza patch from the pharmacy. Pt declines chiropractor referral today stating she \"doesnt like people touching that area\". May consider multiple intercostal nerve blocks in the future and may also consider pulsed radiofrequency treatment at the same levels if effective. Prior to that we will try to obtain Qutenza patch and apply the patch to the right sided rib pain and neuralgia in the future within the clinic. Follow up ongoing assessment and ongoing development of integrative and comprehensive plan of care for chronic pain. Pt reports she is interested in transferring her pain management care to Dr Sebastien Esteban. She requests a printed referral today. Advised patient she needs to file out form to release her records to be sent there. She is to follow up with our clinic in 3 months and may cancel at any time if needed. Goals: To establish complementary and integrative plan of care to address chronic pain issues while minimizing pharmaceuticals to maximize patient's function improve quality of life. Education: Patient again educated on the importance of strict compliance with the opioid care agreement while on opioid therapy. Patient also again educated that they should avoid driving while on chronic opioid therapy. Also advised to avoid alcohol and to avoid benzodiazepines while on opioid therapy. Handouts given regarding opioid safety. Patient Homework: 
Continue to independently investigate yoga for persons with chronic pain. Follow-up Disposition: 
Return in about 3 months (around 1/3/2019) for 30 min. [address] was used for portions of this report. Unintended errors may occur.

## 2023-08-16 ENCOUNTER — HOSPITAL ENCOUNTER (OUTPATIENT)
Facility: HOSPITAL | Age: 73
Discharge: HOME | End: 2023-08-19
Payer: MEDICARE

## 2023-08-16 DIAGNOSIS — M85.89 DISAPPEARING BONE DISEASE: ICD-10-CM

## 2023-08-16 PROCEDURE — 77080 DXA BONE DENSITY AXIAL: CPT
